# Patient Record
Sex: FEMALE | Race: WHITE | NOT HISPANIC OR LATINO | URBAN - METROPOLITAN AREA
[De-identification: names, ages, dates, MRNs, and addresses within clinical notes are randomized per-mention and may not be internally consistent; named-entity substitution may affect disease eponyms.]

---

## 2018-03-05 DIAGNOSIS — E03.9 ACQUIRED HYPOTHYROIDISM: Primary | ICD-10-CM

## 2018-03-05 RX ORDER — LEVOTHYROXINE SODIUM 100 UG/1
TABLET ORAL
COMMUNITY
Start: 2017-09-12 | End: 2018-03-05 | Stop reason: SDUPTHER

## 2018-03-05 RX ORDER — LEVOTHYROXINE SODIUM 100 UG/1
100 TABLET ORAL DAILY
Qty: 90 TABLET | Refills: 0 | Status: SHIPPED | OUTPATIENT
Start: 2018-03-05 | End: 2018-06-01 | Stop reason: SDUPTHER

## 2018-03-05 RX ORDER — ACETAMINOPHEN 500 MG
TABLET ORAL
COMMUNITY
Start: 2017-11-15

## 2018-03-05 RX ORDER — FAMOTIDINE 40 MG/1
40 TABLET, FILM COATED ORAL
COMMUNITY
Start: 2017-11-15 | End: 2018-12-06 | Stop reason: SDUPTHER

## 2018-03-05 RX ORDER — PROMETHAZINE HYDROCHLORIDE AND DEXTROMETHORPHAN HYDROBROMIDE 6.25; 15 MG/5ML; MG/5ML
SYRUP ORAL
COMMUNITY
Start: 2017-12-08 | End: 2018-06-06 | Stop reason: ALTCHOICE

## 2018-03-05 RX ORDER — FLUTICASONE PROPIONATE AND SALMETEROL XINAFOATE 115; 21 UG/1; UG/1
AEROSOL, METERED RESPIRATORY (INHALATION)
COMMUNITY
Start: 2017-01-27 | End: 2019-01-06 | Stop reason: SDUPTHER

## 2018-03-05 RX ORDER — CIPROFLOXACIN AND DEXAMETHASONE 3; 1 MG/ML; MG/ML
SUSPENSION/ DROPS AURICULAR (OTIC)
COMMUNITY
Start: 2017-11-21 | End: 2018-06-06 | Stop reason: ALTCHOICE

## 2018-06-01 ENCOUNTER — TELEPHONE (OUTPATIENT)
Dept: FAMILY MEDICINE | Facility: CLINIC | Age: 58
End: 2018-06-01

## 2018-06-01 DIAGNOSIS — E03.9 ACQUIRED HYPOTHYROIDISM: ICD-10-CM

## 2018-06-01 RX ORDER — LEVOTHYROXINE SODIUM 100 UG/1
100 TABLET ORAL DAILY
Qty: 90 TABLET | Refills: 0 | Status: SHIPPED | OUTPATIENT
Start: 2018-06-01 | End: 2018-08-29 | Stop reason: SDUPTHER

## 2018-06-01 RX ORDER — LEVOTHYROXINE SODIUM 100 UG/1
100 TABLET ORAL DAILY
Qty: 90 TABLET | Refills: 0 | Status: SHIPPED | OUTPATIENT
Start: 2018-06-01 | End: 2018-06-01 | Stop reason: SDUPTHER

## 2018-06-05 ENCOUNTER — TELEPHONE (OUTPATIENT)
Dept: FAMILY MEDICINE | Facility: CLINIC | Age: 58
End: 2018-06-05

## 2018-06-05 NOTE — TELEPHONE ENCOUNTER
Notified pt       ----- Message from Brigitte Andrea DO sent at 6/1/2018  3:13 PM EDT -----  Let pt know that the dexa scan showed normal bone density

## 2018-06-06 ENCOUNTER — OFFICE VISIT (OUTPATIENT)
Dept: FAMILY MEDICINE | Facility: CLINIC | Age: 58
End: 2018-06-06
Payer: COMMERCIAL

## 2018-06-06 VITALS
TEMPERATURE: 98 F | RESPIRATION RATE: 16 BRPM | SYSTOLIC BLOOD PRESSURE: 144 MMHG | BODY MASS INDEX: 43.98 KG/M2 | HEART RATE: 80 BPM | HEIGHT: 63 IN | WEIGHT: 248.2 LBS | DIASTOLIC BLOOD PRESSURE: 92 MMHG

## 2018-06-06 DIAGNOSIS — Z00.00 ROUTINE HISTORY AND PHYSICAL EXAMINATION OF ADULT: ICD-10-CM

## 2018-06-06 DIAGNOSIS — K63.5 POLYP OF COLON, UNSPECIFIED PART OF COLON, UNSPECIFIED TYPE: ICD-10-CM

## 2018-06-06 DIAGNOSIS — C43.9 MALIGNANT MELANOMA OF SKIN (CMS/HCC): ICD-10-CM

## 2018-06-06 DIAGNOSIS — Z11.59 NEED FOR HEPATITIS C SCREENING TEST: ICD-10-CM

## 2018-06-06 DIAGNOSIS — E03.9 ACQUIRED HYPOTHYROIDISM: ICD-10-CM

## 2018-06-06 DIAGNOSIS — Z00.00 ROUTINE GENERAL MEDICAL EXAMINATION AT A HEALTH CARE FACILITY: Primary | ICD-10-CM

## 2018-06-06 DIAGNOSIS — J45.21 MILD INTERMITTENT ASTHMA WITH ACUTE EXACERBATION: ICD-10-CM

## 2018-06-06 PROCEDURE — 99396 PREV VISIT EST AGE 40-64: CPT | Performed by: FAMILY MEDICINE

## 2018-06-06 NOTE — PATIENT INSTRUCTIONS
Sign for [facility]      [phone]

## 2018-06-06 NOTE — PROGRESS NOTES
Subjective      Patient ID: Edmundo Dumas is a 58 y.o. female.    58 year old here for a well check.  Had surgery on foot and then ended up needing to wear boot longer for a stress fracture.  Pt lives in [place].  She has  a new grandchild        Tobacco  Allergies  Meds  Problems  Med Hx  Surg Hx  Fam Hx       Review of Systems   Constitutional: Negative.    HENT: Negative.    Eyes: Negative.    Respiratory: Negative.    Cardiovascular: Negative.    Gastrointestinal: Negative.    Endocrine: Negative.    Musculoskeletal: Positive for joint swelling.   Neurological: Negative.    Hematological: Negative.    Psychiatric/Behavioral: Negative.      Allergies   Allergen Reactions   • Penicillins      Other reaction(s): Hives     Current Outpatient Prescriptions   Medication Sig Dispense Refill   • cholecalciferol, vitamin D3, (VITAMIN D3) 2,000 unit capsule No SIG Entered     • famotidine (PEPCID) 40 mg tablet 40 mg.     • fluticasone-salmeterol (ADVAIR HFA) 115-21 mcg/actuation inhaler inhale 2 puff by inhalation route 2 times every day in the morning and evening     • levothyroxine (SYNTHROID) 100 mcg tablet Take 1 tablet (100 mcg total) by mouth daily. 90 tablet 0     No current facility-administered medications for this visit.      Past Medical History:   Diagnosis Date   • Hx of phlebitis    • Hypothyroid 4/10/2014    Overview:    • Malignant melanoma of skin (CMS/HCC) (HCC) 4/10/2014    Overview:    • Mild intermittent asthma with acute exacerbation 4/10/2014    Overview:    • Reflux esophagitis 4/10/2014    Overview:      Past Surgical History:   Procedure Laterality Date   • POLYPECTOMY      from stomach   • THYROIDECTOMY       Social History   Substance Use Topics   • Smoking status: Never Smoker   • Smokeless tobacco: Never Used   • Alcohol use Not on file     Family History   Problem Relation Age of Onset   • Heart disease Mother    • Arrhythmia Mother    • Hypertension Mother        Objective  "  Vitals:    06/06/18 1004   BP: (!) 144/92   Pulse: 80   Resp: 16   Temp: 36.7 °C (98 °F)   Weight: 113 kg (248 lb 3.2 oz)   Height: 1.6 m (5' 3\")    Body mass index is 43.97 kg/m².  Physical Exam   Constitutional: She appears well-developed and well-nourished.   Eyes: Conjunctivae are normal. Pupils are equal, round, and reactive to light.   Neck: Normal range of motion.   Cardiovascular: Normal rate, regular rhythm and normal heart sounds.    Pulmonary/Chest: Effort normal and breath sounds normal.   Abdominal: Soft.   Musculoskeletal: Normal range of motion.   Neurological: She is alert.   Psychiatric: She has a normal mood and affect.       Assessment/Plan   Problem List Items Addressed This Visit     Mild intermittent asthma with acute exacerbation     Pt uses Advair regularly. Has prn albuteral prn         Colon polyp     Have old records sent to confirm dates of colonoscopy.    Last 2013          Hypothyroid     Lab Results   Component Value Date    TSH 0.816 10/17/2016     '  Needs new labs done         Malignant melanoma of skin (CMS/HCC) (HCC)     See derm regularly         Routine history and physical examination of adult     Check routine labs  Mammogram rx given  Have Colonoscopy report sent   Normal bone density         Relevant Orders    CBC with differential    TSH    Comprehensive metabolic panel    Lipid panel    Urinalysis with microscopic    BI SCREENING MAMMOGRAM BILATERAL    Need for hepatitis C screening test     Lab slip given         Relevant Orders    Hepatitis C antibody    CBC with differential    TSH    Comprehensive metabolic panel    Lipid panel    Urinalysis with microscopic    BI SCREENING MAMMOGRAM BILATERAL    BMI 40.0-44.9, adult (CMS/HCC) (HCC)     Pt with BMI  43.  Encouraged weight loss when can move again after healing of the foot.           Relevant Orders    Hemoglobin A1c      Other Visit Diagnoses     Routine general medical examination at a health care facility    -  " Primary    Relevant Orders    CBC with differential    TSH    Comprehensive metabolic panel    Lipid panel    Urinalysis with microscopic    BI SCREENING MAMMOGRAM BILATERAL    Urinalysis with microscopic          Patient agrees and verbalizes understanding of medication and treatment plan discussed at today's visit.  Patient was instructed to call or follow-up if symptoms persist or worsen.    No notes on file    Brigitte Andrea DO    This note was created with voice recognition software. Inadvertent dictation errors should be disregarded. Please contact my office for clarification.

## 2018-06-16 PROBLEM — Z00.00 ROUTINE HISTORY AND PHYSICAL EXAMINATION OF ADULT: Status: ACTIVE | Noted: 2018-06-16

## 2018-06-16 PROBLEM — E66.813 OBESITY, CLASS III, BMI 40-49.9 (MORBID OBESITY): Status: ACTIVE | Noted: 2018-06-16

## 2018-06-16 PROBLEM — E66.01 OBESITY, CLASS III, BMI 40-49.9 (MORBID OBESITY) (CMS/HCC): Status: ACTIVE | Noted: 2018-06-16

## 2018-06-16 PROBLEM — Z11.59 NEED FOR HEPATITIS C SCREENING TEST: Status: ACTIVE | Noted: 2018-06-16

## 2018-06-16 ASSESSMENT — ENCOUNTER SYMPTOMS
CONSTITUTIONAL NEGATIVE: 1
JOINT SWELLING: 1
HEMATOLOGIC/LYMPHATIC NEGATIVE: 1
PSYCHIATRIC NEGATIVE: 1
NEUROLOGICAL NEGATIVE: 1
ENDOCRINE NEGATIVE: 1
EYES NEGATIVE: 1
RESPIRATORY NEGATIVE: 1
GASTROINTESTINAL NEGATIVE: 1
CARDIOVASCULAR NEGATIVE: 1

## 2018-08-29 DIAGNOSIS — E03.9 ACQUIRED HYPOTHYROIDISM: ICD-10-CM

## 2018-08-29 RX ORDER — LEVOTHYROXINE SODIUM 100 UG/1
100 TABLET ORAL DAILY
Qty: 90 TABLET | Refills: 0 | Status: SHIPPED | OUTPATIENT
Start: 2018-08-29 | End: 2018-11-26 | Stop reason: SDUPTHER

## 2018-08-29 NOTE — TELEPHONE ENCOUNTER
Lab Results   Component Value Date    TSH 0.816 10/17/2016     Please call pt and let her know I am going to fill her thryoid med but do not have a copy of her recent labs so want to make sure if she did them that I get a copy and if she has't gotten there yet that she has them done.  They were written on 6/6/18

## 2018-09-15 LAB
ALBUMIN SERPL-MCNC: 3.9 G/DL (ref 3.6–5.1)
ALBUMIN/GLOB SERPL: 1.5 (CALC) (ref 1–2.5)
ALP SERPL-CCNC: 54 U/L (ref 33–130)
ALT SERPL-CCNC: 11 U/L (ref 6–29)
APPEARANCE UR: CLEAR
AST SERPL-CCNC: 10 U/L (ref 10–35)
BACTERIA #/AREA URNS HPF: (no result) /HPF
BASOPHILS # BLD AUTO: 21 CELLS/UL (ref 0–200)
BASOPHILS NFR BLD AUTO: 0.4 %
BILIRUB SERPL-MCNC: 0.4 MG/DL (ref 0.2–1.2)
BILIRUB UR QL STRIP: NEGATIVE
BUN SERPL-MCNC: 17 MG/DL (ref 7–25)
BUN/CREAT SERPL: NORMAL (CALC) (ref 6–22)
CALCIUM SERPL-MCNC: 9 MG/DL (ref 8.6–10.4)
CHLORIDE SERPL-SCNC: 105 MMOL/L (ref 98–110)
CHOLEST SERPL-MCNC: 182 MG/DL
CHOLEST/HDLC SERPL: 2.8 (CALC)
CO2 SERPL-SCNC: 30 MMOL/L (ref 20–32)
COLOR UR: YELLOW
CREAT SERPL-MCNC: 0.78 MG/DL (ref 0.5–0.99)
EOSINOPHIL # BLD AUTO: 161 CELLS/UL (ref 15–500)
EOSINOPHIL NFR BLD AUTO: 3.1 %
ERYTHROCYTE [DISTWIDTH] IN BLOOD BY AUTOMATED COUNT: 12.3 % (ref 11–15)
GFR SERPL CREATININE-BSD FRML MDRD: 83 ML/MIN/1.73M2
GLOBULIN SER CALC-MCNC: 2.6 G/DL (CALC) (ref 1.9–3.7)
GLUCOSE SERPL-MCNC: 95 MG/DL (ref 65–99)
GLUCOSE UR QL STRIP: NEGATIVE
HBA1C MFR BLD: 5.5 % OF TOTAL HGB
HCT VFR BLD AUTO: 38.8 % (ref 35–45)
HCV AB S/CO SERPL IA: 0.01
HCV AB SERPL QL IA: NORMAL
HDLC SERPL-MCNC: 64 MG/DL
HGB BLD-MCNC: 13.1 G/DL (ref 11.7–15.5)
HGB UR QL STRIP: NEGATIVE
HYALINE CASTS #/AREA URNS LPF: (no result) /LPF
KETONES UR QL STRIP: NEGATIVE
LDLC SERPL CALC-MCNC: 103 MG/DL (CALC)
LEUKOCYTE ESTERASE UR QL STRIP: NEGATIVE
LYMPHOCYTES # BLD AUTO: 1706 CELLS/UL (ref 850–3900)
LYMPHOCYTES NFR BLD AUTO: 32.8 %
MCH RBC QN AUTO: 30.7 PG (ref 27–33)
MCHC RBC AUTO-ENTMCNC: 33.8 G/DL (ref 32–36)
MCV RBC AUTO: 90.9 FL (ref 80–100)
MONOCYTES # BLD AUTO: 354 CELLS/UL (ref 200–950)
MONOCYTES NFR BLD AUTO: 6.8 %
NEUTROPHILS # BLD AUTO: 2959 CELLS/UL (ref 1500–7800)
NEUTROPHILS NFR BLD AUTO: 56.9 %
NITRITE UR QL STRIP: NEGATIVE
NONHDLC SERPL-MCNC: 118 MG/DL (CALC)
PH UR STRIP: 7.5 [PH] (ref 5–8)
PLATELET # BLD AUTO: 219 THOUSAND/UL (ref 140–400)
PMV BLD REES-ECKER: 12.3 FL (ref 7.5–12.5)
POTASSIUM SERPL-SCNC: 4.3 MMOL/L (ref 3.5–5.3)
PROT SERPL-MCNC: 6.5 G/DL (ref 6.1–8.1)
PROT UR QL STRIP: NEGATIVE
RBC # BLD AUTO: 4.27 MILLION/UL (ref 3.8–5.1)
RBC #/AREA URNS HPF: (no result) /HPF
SODIUM SERPL-SCNC: 140 MMOL/L (ref 135–146)
SP GR UR STRIP: 1.02 (ref 1–1.03)
SQUAMOUS #/AREA URNS HPF: (no result) /HPF
TRIGL SERPL-MCNC: 63 MG/DL
TSH SERPL-ACNC: 0.96 MIU/L (ref 0.4–4.5)
WBC # BLD AUTO: 5.2 THOUSAND/UL (ref 3.8–10.8)
WBC #/AREA URNS HPF: (no result) /HPF

## 2018-09-19 NOTE — PROGRESS NOTES
Let patient know her CBC, metabolic panel, thyroid, urinalysis were all normal.  Her hemoglobin A1c was in the normal range as well.  Her hepatitis C antibody test was negative.    her cholesterol was good at 182.  Her HDL is good at 64, and her LDL was good at 103

## 2018-09-20 ENCOUNTER — TELEPHONE (OUTPATIENT)
Dept: FAMILY MEDICINE | Facility: CLINIC | Age: 58
End: 2018-09-20

## 2018-09-20 NOTE — TELEPHONE ENCOUNTER
Notified pt    ----- Message from Brigitte Andrea DO sent at 9/18/2018 10:39 PM EDT -----  Let patient know her CBC, metabolic panel, thyroid, urinalysis were all normal.  Her hemoglobin A1c was in the normal range as well.  Her hepatitis C antibody test was negative.    her cholesterol was good at 182.  Her HDL is good at 64, and her LDL was good at 103

## 2018-11-01 ENCOUNTER — TELEPHONE (OUTPATIENT)
Dept: FAMILY MEDICINE | Facility: CLINIC | Age: 58
End: 2018-11-01

## 2018-11-02 RX ORDER — AZITHROMYCIN 250 MG/1
TABLET, FILM COATED ORAL
Qty: 6 TABLET | Refills: 0 | Status: SHIPPED | OUTPATIENT
Start: 2018-11-02 | End: 2019-03-25 | Stop reason: ALTCHOICE

## 2018-11-02 RX ORDER — ALBUTEROL SULFATE 90 UG/1
2 INHALANT RESPIRATORY (INHALATION) EVERY 6 HOURS PRN
Qty: 1 INHALER | Refills: 1 | Status: SHIPPED | OUTPATIENT
Start: 2018-11-02 | End: 2019-01-28 | Stop reason: SDUPTHER

## 2018-11-02 RX ORDER — ALBUTEROL SULFATE 0.83 MG/ML
2.5 SOLUTION RESPIRATORY (INHALATION) EVERY 6 HOURS PRN
Qty: 1 PACKAGE | Refills: 1 | Status: SHIPPED | OUTPATIENT
Start: 2018-11-02 | End: 2019-06-11 | Stop reason: SDUPTHER

## 2018-11-02 NOTE — TELEPHONE ENCOUNTER
Pt aware z pack and albuteral inhaler  as needed  called in. patient also aware that albuterol nebulizers were sent because she does have a nebulizer that she can use as needed.  Patient often will get wheezing and asthmatic flare with upper respiratory infections.  She is aware with fever or significant shortness of breath that she should be evaluated closer to home since she lives in [facility] but continues to come here.

## 2018-11-06 ENCOUNTER — TELEPHONE (OUTPATIENT)
Dept: FAMILY MEDICINE | Facility: CLINIC | Age: 58
End: 2018-11-06

## 2018-11-06 NOTE — TELEPHONE ENCOUNTER
Pt lm - update starting to feel better, just finished zpak. Still has some wheezing is continuing to use inhaler

## 2018-11-08 ENCOUNTER — TELEPHONE (OUTPATIENT)
Dept: FAMILY MEDICINE | Facility: CLINIC | Age: 58
End: 2018-11-08

## 2018-11-08 NOTE — TELEPHONE ENCOUNTER
Lm for patient     ----- Message from Brigitte Andrea DO sent at 11/8/2018 11:42 AM EST -----  Let pt know that the mammogram was stable.  No sign of malignancy.  Re ck in 1 year

## 2018-11-09 ENCOUNTER — TELEPHONE (OUTPATIENT)
Dept: FAMILY MEDICINE | Facility: CLINIC | Age: 58
End: 2018-11-09

## 2018-11-26 DIAGNOSIS — E03.9 ACQUIRED HYPOTHYROIDISM: ICD-10-CM

## 2018-11-26 RX ORDER — LEVOTHYROXINE SODIUM 100 UG/1
TABLET ORAL
Qty: 90 TABLET | Refills: 0 | Status: SHIPPED | OUTPATIENT
Start: 2018-11-26 | End: 2019-05-18 | Stop reason: SDUPTHER

## 2018-11-30 DIAGNOSIS — E03.9 ACQUIRED HYPOTHYROIDISM: ICD-10-CM

## 2018-11-30 RX ORDER — LEVOTHYROXINE SODIUM 100 UG/1
TABLET ORAL
Qty: 90 TABLET | Refills: 0 | Status: SHIPPED | OUTPATIENT
Start: 2018-11-30 | End: 2019-03-25 | Stop reason: ALTCHOICE

## 2018-12-06 RX ORDER — FAMOTIDINE 40 MG/1
TABLET, FILM COATED ORAL
Qty: 180 TABLET | Refills: 4 | Status: SHIPPED | OUTPATIENT
Start: 2018-12-06 | End: 2019-12-05 | Stop reason: SDUPTHER

## 2019-01-07 RX ORDER — FLUTICASONE PROPIONATE AND SALMETEROL XINAFOATE 115; 21 UG/1; UG/1
AEROSOL, METERED RESPIRATORY (INHALATION)
Qty: 12 G | Refills: 1 | Status: SHIPPED | OUTPATIENT
Start: 2019-01-07 | End: 2019-03-25 | Stop reason: ALTCHOICE

## 2019-01-28 RX ORDER — ALBUTEROL SULFATE 90 UG/1
AEROSOL, METERED RESPIRATORY (INHALATION)
Qty: 18 INHALER | Refills: 1 | Status: SHIPPED | OUTPATIENT
Start: 2019-01-28 | End: 2021-07-14 | Stop reason: SDUPTHER

## 2019-03-25 ENCOUNTER — OFFICE VISIT (OUTPATIENT)
Dept: FAMILY MEDICINE | Facility: CLINIC | Age: 59
End: 2019-03-25
Payer: COMMERCIAL

## 2019-03-25 VITALS
TEMPERATURE: 97.9 F | DIASTOLIC BLOOD PRESSURE: 70 MMHG | BODY MASS INDEX: 43.94 KG/M2 | WEIGHT: 248 LBS | RESPIRATION RATE: 18 BRPM | HEIGHT: 63 IN | SYSTOLIC BLOOD PRESSURE: 108 MMHG | HEART RATE: 100 BPM

## 2019-03-25 DIAGNOSIS — R07.89 CHEST DISCOMFORT: Primary | ICD-10-CM

## 2019-03-25 DIAGNOSIS — C43.9 MALIGNANT MELANOMA OF SKIN (CMS/HCC): ICD-10-CM

## 2019-03-25 DIAGNOSIS — K21.00 REFLUX ESOPHAGITIS: ICD-10-CM

## 2019-03-25 DIAGNOSIS — E03.9 ACQUIRED HYPOTHYROIDISM: ICD-10-CM

## 2019-03-25 DIAGNOSIS — J45.21 MILD INTERMITTENT ASTHMA WITH ACUTE EXACERBATION: ICD-10-CM

## 2019-03-25 DIAGNOSIS — E66.01 OBESITY, CLASS III, BMI 40-49.9 (MORBID OBESITY) (CMS/HCC): ICD-10-CM

## 2019-03-25 DIAGNOSIS — R53.83 FATIGUE, UNSPECIFIED TYPE: ICD-10-CM

## 2019-03-25 DIAGNOSIS — J45.909 UNCOMPLICATED ASTHMA, UNSPECIFIED ASTHMA SEVERITY, UNSPECIFIED WHETHER PERSISTENT: ICD-10-CM

## 2019-03-25 PROBLEM — M19.019 OSTEOARTHRITIS OF ACROMIOCLAVICULAR JOINT: Status: ACTIVE | Noted: 2019-03-25

## 2019-03-25 PROBLEM — M19.90 ARTHRITIS: Status: ACTIVE | Noted: 2019-03-25

## 2019-03-25 PROBLEM — F41.9 ANXIETY: Status: ACTIVE | Noted: 2019-03-25

## 2019-03-25 PROBLEM — R12 HEARTBURN: Status: ACTIVE | Noted: 2019-03-25

## 2019-03-25 PROBLEM — R10.13 EPIGASTRIC PAIN: Status: ACTIVE | Noted: 2019-03-25

## 2019-03-25 PROCEDURE — 93000 ELECTROCARDIOGRAM COMPLETE: CPT | Performed by: FAMILY MEDICINE

## 2019-03-25 PROCEDURE — 99214 OFFICE O/P EST MOD 30 MIN: CPT | Mod: 25 | Performed by: FAMILY MEDICINE

## 2019-03-25 PROCEDURE — 36415 COLL VENOUS BLD VENIPUNCTURE: CPT | Performed by: FAMILY MEDICINE

## 2019-03-25 ASSESSMENT — ENCOUNTER SYMPTOMS
RESPIRATORY NEGATIVE: 1
CONSTITUTIONAL NEGATIVE: 1
PALPITATIONS: 1

## 2019-03-25 NOTE — PROGRESS NOTES
Subjective      Patient ID: Edmundo Dumas is a 59 y.o. female.    HPI    Problems       Review of Systems  Allergies   Allergen Reactions   • Contrast  [Iodinated Contrast- Oral And Iv Dye] Nausea And Vomiting and Dermatitis   • Moxifloxacin Nausea And Vomiting and Dermatitis   • Penicillins      Other reaction(s): Hives   • Sulfa (Sulfonamide Antibiotics) Nausea And Vomiting     Current Outpatient Prescriptions   Medication Sig Dispense Refill   • albuterol 2.5 mg /3 mL (0.083 %) nebulizer solution Take 3 mL (2.5 mg total) by nebulization every 6 (six) hours as needed for wheezing. 1 Package 1   • cholecalciferol, vitamin D3, (VITAMIN D3) 2,000 unit capsule No SIG Entered     • famotidine (PEPCID) 40 mg tablet TAKE ONE TABLET BY MOUTH TWICE DAILY 180 tablet 4   • SYNTHROID 100 mcg tablet TAKE 1 TABLET BY MOUTH EVERY DAY 90 tablet 0   • VENTOLIN HFA 90 mcg/actuation inhaler TAKE 2 PUFFS BY MOUTH EVERY 6 HOURS AS NEEDED FOR WHEEZE 18 Inhaler 1   • ADVAIR -21 mcg/actuation inhaler INHALE 2 PUFF BY INHALATION ROUTE 2 TIMES EVERY DAY IN THE MORNING AND EVENING (Patient not taking: Reported on 3/25/2019) 12 g 1     No current facility-administered medications for this visit.      Past Medical History:   Diagnosis Date   • Hx of phlebitis    • Hypothyroid 4/10/2014    Overview:    • Malignant melanoma of skin (CMS/HCC) (HCC) 4/10/2014    Overview:    • Mild intermittent asthma with acute exacerbation 4/10/2014    Overview:    • Reflux esophagitis 4/10/2014    Overview:      Past Surgical History:   Procedure Laterality Date   • POLYPECTOMY      from stomach   • THYROIDECTOMY       Social History   Substance Use Topics   • Smoking status: Never Smoker   • Smokeless tobacco: Never Used   • Alcohol use Not on file     Family History   Problem Relation Age of Onset   • Heart disease Mother    • Arrhythmia Mother    • Hypertension Mother        Objective   Vitals:    03/25/19 1144   BP: 108/70   Pulse: 100   Resp:  "18   Temp: 36.6 °C (97.9 °F)   Weight: 112 kg (248 lb)   Height: 1.6 m (5' 3\")    Body mass index is 43.93 kg/m².  Physical Exam    Assessment/Plan   Problem List Items Addressed This Visit     Mild intermittent asthma with acute exacerbation     Her pharmacy needs to have info for her advair         Hypothyroid     Lab Results   Component Value Date    TSH 0.96 09/14/2018     Check TSH today          Malignant melanoma of skin (CMS/[facility]) ([facility])     Sees derm regularly         Reflux esophagitis     Famotidine          Obesity, Class III, BMI 40-49.9 (morbid obesity) (CMS/[facility]) ([facility])    Chest discomfort - Primary     EKG NSR  Atypical in nature  Seconds in left chest and then none    3 x over last          Relevant Orders    ECG 12 LEAD OFFICE PERFORMED    CBC and Differential    Comprehensive metabolic panel    TSH 3rd Generation    Fatigue     Check labs  EKG ok          Relevant Orders    ECG 12 LEAD OFFICE PERFORMED    CBC and Differential    Comprehensive metabolic panel    TSH 3rd Generation          Patient agrees and verbalizes understanding of medication and treatment plan discussed at today's visit.  Patient was instructed to call or follow-up if symptoms persist or worsen.    No notes on file    Brigitte Andrea,       This note was created with voice recognition software. Inadvertent dictation errors should be disregarded. Please contact my office for clarification.  "

## 2019-03-25 NOTE — ASSESSMENT & PLAN NOTE
Famotidine if flares will use omeprazole OTC strength  With her atypical pain that is worse with laying flat will start omeprazole 40 mg daily and re ck in 8 weeks

## 2019-03-25 NOTE — PROGRESS NOTES
Subjective      Patient ID: Edmundo Dumas is a 59 y.o. female.    Pt started with  sharp chest pains over the last 3 weeks.   She had a sharp pain in the left mid chest.   It lasts minute.   Deep breath would make worse then would resolve. Worse when lays flat.  Some heartburn sx.          Allergies  Meds  Problems  Med Hx  Surg Hx  Fam Hx       Review of Systems   Constitutional: Negative.    HENT: Negative.    Respiratory: Negative.    Cardiovascular: Positive for chest pain and palpitations.     Allergies   Allergen Reactions   • Contrast  [Iodinated Contrast- Oral And Iv Dye] Nausea And Vomiting and Dermatitis   • Moxifloxacin Nausea And Vomiting and Dermatitis   • Penicillins      Other reaction(s): Hives   • Sulfa (Sulfonamide Antibiotics) Nausea And Vomiting     Current Outpatient Prescriptions   Medication Sig Dispense Refill   • albuterol 2.5 mg /3 mL (0.083 %) nebulizer solution Take 3 mL (2.5 mg total) by nebulization every 6 (six) hours as needed for wheezing. 1 Package 1   • cholecalciferol, vitamin D3, (VITAMIN D3) 2,000 unit capsule No SIG Entered     • famotidine (PEPCID) 40 mg tablet TAKE ONE TABLET BY MOUTH TWICE DAILY 180 tablet 4   • SYNTHROID 100 mcg tablet TAKE 1 TABLET BY MOUTH EVERY DAY 90 tablet 0   • VENTOLIN HFA 90 mcg/actuation inhaler TAKE 2 PUFFS BY MOUTH EVERY 6 HOURS AS NEEDED FOR WHEEZE 18 Inhaler 1   • mometasone-formoterol (DULERA) 200-5 mcg/actuation inhaler Inhale 2 puffs 2 (two) times a day. Rinse mouth with water after use to reduce aftertaste and incidence of candidiasis. Do not swallow. 13 g 3     No current facility-administered medications for this visit.      Past Medical History:   Diagnosis Date   • Hx of phlebitis    • Hypothyroid 4/10/2014    Overview:    • Malignant melanoma of skin (CMS/HCC) ([facility]) 4/10/2014    Overview:    • Mild intermittent asthma with acute exacerbation 4/10/2014    Overview:    • Reflux esophagitis 4/10/2014    Overview:      Past  "Surgical History:   Procedure Laterality Date   • POLYPECTOMY      from stomach   • THYROIDECTOMY       Social History   Substance Use Topics   • Smoking status: Never Smoker   • Smokeless tobacco: Never Used   • Alcohol use Not on file     Family History   Problem Relation Age of Onset   • Heart disease Mother    • Arrhythmia Mother    • Hypertension Mother        Objective   Vitals:    03/25/19 1144   BP: 108/70   Pulse: 100   Resp: 18   Temp: 36.6 °C (97.9 °F)   Weight: 112 kg (248 lb)   Height: 1.6 m (5' 3\")    Body mass index is 43.93 kg/m².  Physical Exam   Constitutional: She appears well-developed and well-nourished.   Cardiovascular: Normal rate and regular rhythm.        Assessment/Plan   Problem List Items Addressed This Visit     Mild intermittent asthma with acute exacerbation     Her pharmacy needs to have info for her advair         Relevant Medications    mometasone-formoterol (DULERA) 200-5 mcg/actuation inhaler    Hypothyroid     Lab Results   Component Value Date    TSH 0.96 09/14/2018     Check TSH today          Malignant melanoma of skin (CMS/HCC) ([facility])     Sees derm regularly         Reflux esophagitis     Famotidine if flares will use omeprazole OTC strength  With her atypical pain that is worse with laying flat will start omeprazole 40 mg daily and re ck in 8 weeks             Obesity, Class III, BMI 40-49.9 (morbid obesity) (CMS/HCC) ([facility])    BMI 40.0-44.9, adult (CMS/HCC) ([facility])     BMI  43         Chest discomfort - Primary     EKG NSR  Atypical in nature  Seconds in left chest and then none    3 x over last few weeks  Start omeprazole  40 mg( some worse pain laying flat in bed)   Exercise stress test given          Relevant Orders    ECG 12 LEAD OFFICE PERFORMED (Completed)    CBC and Differential (Completed)    Comprehensive metabolic panel (Completed)    TSH 3rd Generation (Completed)    Exercise stress test    Fatigue     Check labs as  Ordered   EKG ok          Relevant Orders    ECG " 12 LEAD OFFICE PERFORMED (Completed)    CBC and Differential (Completed)    Comprehensive metabolic panel (Completed)    TSH 3rd Generation (Completed)    Exercise stress test    Asthma     Pt was changed to Dulera due to cost ,  If pt does not do well with this we will try to get advari approved.  She can not tolerate Adviar HFA so do not think she will be able to do the other option on her formulary which is Breo          Relevant Medications    mometasone-formoterol (DULERA) 200-5 mcg/actuation inhaler          Patient agrees and verbalizes understanding of medication and treatment plan discussed at today's visit.  Patient was instructed to call or follow-up if symptoms persist or worsen.    No notes on file    Brigitte Andrea,       This note was created with voice recognition software. Inadvertent dictation errors should be disregarded. Please contact my office for clarification.

## 2019-03-25 NOTE — ASSESSMENT & PLAN NOTE
EKG NSR  Atypical in nature  Seconds in left chest and then none    3 x over last few weeks  Start omeprazole  40 mg( some worse pain laying flat in bed)   Exercise stress test given

## 2019-03-26 LAB
ALBUMIN SERPL-MCNC: 4 G/DL (ref 3.6–4.8)
ALBUMIN/GLOB SERPL: 1.5 {RATIO} (ref 1.2–2.2)
ALP SERPL-CCNC: 58 IU/L (ref 39–117)
ALT SERPL-CCNC: 13 IU/L (ref 0–32)
AST SERPL-CCNC: 14 IU/L (ref 0–40)
BASOPHILS # BLD AUTO: 0 X10E3/UL (ref 0–0.2)
BASOPHILS NFR BLD AUTO: 1 %
BILIRUB SERPL-MCNC: 0.4 MG/DL (ref 0–1.2)
BUN SERPL-MCNC: 17 MG/DL (ref 8–27)
BUN/CREAT SERPL: 25 (ref 12–28)
CALCIUM SERPL-MCNC: 9.5 MG/DL (ref 8.7–10.3)
CHLORIDE SERPL-SCNC: 103 MMOL/L (ref 96–106)
CO2 SERPL-SCNC: 23 MMOL/L (ref 20–29)
CREAT SERPL-MCNC: 0.69 MG/DL (ref 0.57–1)
EOSINOPHIL # BLD AUTO: 0.1 X10E3/UL (ref 0–0.4)
EOSINOPHIL NFR BLD AUTO: 1 %
ERYTHROCYTE [DISTWIDTH] IN BLOOD BY AUTOMATED COUNT: 13.6 % (ref 12.3–15.4)
GLOBULIN SER CALC-MCNC: 2.7 G/DL (ref 1.5–4.5)
GLUCOSE SERPL-MCNC: 120 MG/DL (ref 65–99)
HCT VFR BLD AUTO: 41.3 % (ref 34–46.6)
HGB BLD-MCNC: 13.6 G/DL (ref 11.1–15.9)
IMM GRANULOCYTES # BLD AUTO: 0 X10E3/UL (ref 0–0.1)
IMM GRANULOCYTES NFR BLD AUTO: 0 %
LAB CORP EGFR IF AFRICN AM: 109 ML/MIN/1.73
LAB CORP EGFR IF NONAFRICN AM: 94 ML/MIN/1.73
LYMPHOCYTES # BLD AUTO: 2.5 X10E3/UL (ref 0.7–3.1)
LYMPHOCYTES NFR BLD AUTO: 29 %
MCH RBC QN AUTO: 29.6 PG (ref 26.6–33)
MCHC RBC AUTO-ENTMCNC: 32.9 G/DL (ref 31.5–35.7)
MCV RBC AUTO: 90 FL (ref 79–97)
MONOCYTES # BLD AUTO: 0.4 X10E3/UL (ref 0.1–0.9)
MONOCYTES NFR BLD AUTO: 5 %
NEUTROPHILS # BLD AUTO: 5.4 X10E3/UL (ref 1.4–7)
NEUTROPHILS NFR BLD AUTO: 64 %
PLATELET # BLD AUTO: 253 X10E3/UL (ref 150–379)
POTASSIUM SERPL-SCNC: 4.2 MMOL/L (ref 3.5–5.2)
PROT SERPL-MCNC: 6.7 G/DL (ref 6–8.5)
RBC # BLD AUTO: 4.6 X10E6/UL (ref 3.77–5.28)
SODIUM SERPL-SCNC: 143 MMOL/L (ref 134–144)
TSH SERPL DL<=0.005 MIU/L-ACNC: 0.91 UIU/ML (ref 0.45–4.5)
WBC # BLD AUTO: 8.5 X10E3/UL (ref 3.4–10.8)

## 2019-03-27 ENCOUNTER — TELEPHONE (OUTPATIENT)
Dept: FAMILY MEDICINE | Facility: CLINIC | Age: 59
End: 2019-03-27

## 2019-03-27 NOTE — TELEPHONE ENCOUNTER
----- Message from Brigitte Andrea DO sent at 3/26/2019  8:02 PM EDT -----  Let pt know that her labs were all ok

## 2019-03-27 NOTE — ASSESSMENT & PLAN NOTE
Pt was changed to Dulera due to cost ,  If pt does not do well with this we will try to get advari approved.  She can not tolerate Adviar HFA so do not think she will be able to do the other option on her formulary which is Breo

## 2019-03-28 ENCOUNTER — TELEPHONE (OUTPATIENT)
Dept: FAMILY MEDICINE | Facility: CLINIC | Age: 59
End: 2019-03-28

## 2019-03-28 NOTE — TELEPHONE ENCOUNTER
REY for patient    ----- Message from Brigitte Andrea DO sent at 3/26/2019  8:02 PM EDT -----  Let pt know that her labs were all ok

## 2019-05-17 DIAGNOSIS — E03.9 ACQUIRED HYPOTHYROIDISM: ICD-10-CM

## 2019-05-18 RX ORDER — LEVOTHYROXINE SODIUM 100 UG/1
TABLET ORAL
Qty: 90 TABLET | Refills: 0 | Status: SHIPPED | OUTPATIENT
Start: 2019-05-18 | End: 2019-08-15 | Stop reason: SDUPTHER

## 2019-06-11 ENCOUNTER — OFFICE VISIT (OUTPATIENT)
Dept: FAMILY MEDICINE | Facility: CLINIC | Age: 59
End: 2019-06-11
Payer: COMMERCIAL

## 2019-06-11 VITALS
BODY MASS INDEX: 44.83 KG/M2 | RESPIRATION RATE: 16 BRPM | HEART RATE: 73 BPM | TEMPERATURE: 98.5 F | HEIGHT: 63 IN | DIASTOLIC BLOOD PRESSURE: 80 MMHG | WEIGHT: 253 LBS | SYSTOLIC BLOOD PRESSURE: 116 MMHG

## 2019-06-11 DIAGNOSIS — J45.21 MILD INTERMITTENT ASTHMA WITH ACUTE EXACERBATION: ICD-10-CM

## 2019-06-11 DIAGNOSIS — K63.5 POLYP OF COLON, UNSPECIFIED PART OF COLON, UNSPECIFIED TYPE: ICD-10-CM

## 2019-06-11 DIAGNOSIS — R07.89 CHEST DISCOMFORT: ICD-10-CM

## 2019-06-11 DIAGNOSIS — Z12.31 SCREENING MAMMOGRAM, ENCOUNTER FOR: ICD-10-CM

## 2019-06-11 DIAGNOSIS — Z11.59 NEED FOR HEPATITIS C SCREENING TEST: ICD-10-CM

## 2019-06-11 DIAGNOSIS — M19.90 ARTHRITIS: ICD-10-CM

## 2019-06-11 DIAGNOSIS — Z00.00 ROUTINE HISTORY AND PHYSICAL EXAMINATION OF ADULT: Primary | ICD-10-CM

## 2019-06-11 DIAGNOSIS — J45.909 UNCOMPLICATED ASTHMA, UNSPECIFIED ASTHMA SEVERITY, UNSPECIFIED WHETHER PERSISTENT: ICD-10-CM

## 2019-06-11 DIAGNOSIS — F41.9 ANXIETY: ICD-10-CM

## 2019-06-11 DIAGNOSIS — E03.9 ACQUIRED HYPOTHYROIDISM: ICD-10-CM

## 2019-06-11 DIAGNOSIS — C43.9 MALIGNANT MELANOMA OF SKIN (CMS/HCC): ICD-10-CM

## 2019-06-11 PROCEDURE — 36415 COLL VENOUS BLD VENIPUNCTURE: CPT | Performed by: FAMILY MEDICINE

## 2019-06-11 PROCEDURE — 99396 PREV VISIT EST AGE 40-64: CPT | Mod: 25 | Performed by: FAMILY MEDICINE

## 2019-06-11 RX ORDER — ALBUTEROL SULFATE 0.83 MG/ML
2.5 SOLUTION RESPIRATORY (INHALATION) EVERY 6 HOURS PRN
Qty: 1 PACKAGE | Refills: 1 | Status: SHIPPED | OUTPATIENT
Start: 2019-06-11 | End: 2021-07-09 | Stop reason: SDUPTHER

## 2019-06-11 RX ORDER — OMEPRAZOLE 20 MG/1
20 CAPSULE, DELAYED RELEASE ORAL
COMMUNITY
End: 2021-01-22 | Stop reason: DRUGHIGH

## 2019-06-11 ASSESSMENT — ENCOUNTER SYMPTOMS
CARDIOVASCULAR NEGATIVE: 1
CONSTITUTIONAL NEGATIVE: 1

## 2019-06-11 NOTE — PATIENT INSTRUCTIONS
Problem List Items Addressed This Visit     Mild intermittent asthma with acute exacerbation    Relevant Medications    albuterol 2.5 mg /3 mL (0.083 %) nebulizer solution    Colon polyp     Last colon 8/2017          Routine history and physical examination of adult - Primary    Relevant Orders    CBC and Differential    Comprehensive metabolic panel    Hemoglobin A1c    Lipid panel    TSH 3rd Generation    Urinalysis with microscopic    Vitamin B12    Vitamin D 25 hydroxy    BMI 40.0-44.9, adult (CMS/HCC) ([facility])    Chest discomfort     Stress test done  Did not get result  Getting faxed today          Anxiety     Stable. No meds         Relevant Orders    TSH 3rd Generation    Vitamin B12    Vitamin D 25 hydroxy    Arthritis     OA , prn          Asthma     On maintenance Dulera.   200/5   1 inh 2 x day /  With 2 puffs BID will get a HA./            Relevant Medications    albuterol 2.5 mg /3 mL (0.083 %) nebulizer solution

## 2019-06-11 NOTE — PROGRESS NOTES
Subjective      Patient ID: Edmundo Dumas is a 59 y.o. female.    Pt is here for her physical.   She feels ok.  She did have a stress test that was negative         Tobacco  Allergies  Meds  Problems  Med Hx  Surg Hx  Fam Hx       Review of Systems   Constitutional: Negative.         Obesity   HENT: Positive for congestion (nasal).    Eyes: Negative.    Respiratory: Positive for cough (just starting /allergic).    Cardiovascular: Negative.    Gastrointestinal: Negative.    Musculoskeletal: Positive for back pain.   Skin: Negative.    Allergic/Immunologic: Positive for environmental allergies.   Neurological: Negative.    Hematological: Negative.    Psychiatric/Behavioral: Negative.      Allergies   Allergen Reactions   • Contrast  [Iodinated Contrast- Oral And Iv Dye] Nausea And Vomiting and Dermatitis   • Moxifloxacin Nausea And Vomiting and Dermatitis   • Penicillins      Other reaction(s): Hives   • Sulfa (Sulfonamide Antibiotics) Nausea And Vomiting     Current Outpatient Prescriptions   Medication Sig Dispense Refill   • albuterol 2.5 mg /3 mL (0.083 %) nebulizer solution Take 3 mL (2.5 mg total) by nebulization every 6 (six) hours as needed for wheezing. 1 Package 1   • cholecalciferol, vitamin D3, (VITAMIN D3) 2,000 unit capsule No SIG Entered     • famotidine (PEPCID) 40 mg tablet TAKE ONE TABLET BY MOUTH TWICE DAILY 180 tablet 4   • omeprazole (PriLOSEC) 20 mg capsule Take 20 mg by mouth daily before breakfast.     • SYNTHROID 100 mcg tablet TAKE 1 TABLET BY MOUTH EVERY DAY 90 tablet 0   • VENTOLIN HFA 90 mcg/actuation inhaler TAKE 2 PUFFS BY MOUTH EVERY 6 HOURS AS NEEDED FOR WHEEZE 18 Inhaler 1   • mometasone-formoterol (DULERA) 200-5 mcg/actuation inhaler Inhale 2 puffs 2 (two) times a day. Rinse mouth with water after use to reduce aftertaste and incidence of candidiasis. Do not swallow. 13 g 3     No current facility-administered medications for this visit.      Past Medical History:  "  Diagnosis Date   • Hx of phlebitis    • Hypothyroid 4/10/2014    Overview:    • Malignant melanoma of skin (CMS/HCC) ([facility]) 4/10/2014    Overview:    • Mild intermittent asthma with acute exacerbation 4/10/2014    Overview:    • Reflux esophagitis 4/10/2014    Overview:      Past Surgical History:   Procedure Laterality Date   • POLYPECTOMY      from stomach   • THYROIDECTOMY       Social History   Substance Use Topics   • Smoking status: Never Smoker   • Smokeless tobacco: Never Used   • Alcohol use Not on file     Family History   Problem Relation Age of Onset   • Heart disease Mother    • Arrhythmia Mother    • Hypertension Mother        Objective   Vitals:    06/11/19 0831   BP: 116/80   Pulse: 73   Resp: 16   Temp: 36.9 °C (98.5 °F)   Weight: 115 kg (253 lb)   Height: 1.6 m (5' 3\")    Body mass index is 44.82 kg/m².  Physical Exam   Constitutional: She appears well-developed and well-nourished.   HENT:   Mouth/Throat: Oropharynx is clear and moist.   Neck: Normal range of motion.   Cardiovascular: Normal rate, regular rhythm and normal heart sounds.    Pulmonary/Chest: Effort normal.   Abdominal: Soft.   Musculoskeletal: Normal range of motion.   Neurological: She is alert.   Skin: Skin is warm.   Vitals reviewed.      Assessment/Plan   Problem List Items Addressed This Visit     Mild intermittent asthma with acute exacerbation     Pt was switched to Dulera and gets side effects of HA when she takes 2 puffs so she is only able to take 1 puff    Will send the advair again b/c pt is better controlled on this           Relevant Medications    albuterol 2.5 mg /3 mL (0.083 %) nebulizer solution    Colon polyp     Last colon was same as the last EGD   Will need to get sent ( 2017)          Hypothyroid     Lab Results   Component Value Date    TSH 1.230 06/11/2019     Continue same dose of levothyroxine         Malignant melanoma of skin (CMS/HCC) ([facility])     Sees derm regularly          Routine history and physical " examination of adult - Primary     Check labs  Mammogram is up to date  Rx given for November  Had recent stress test that was negative   Colon is up to date  ( get sent from her GI[facility]          Relevant Orders    CBC and Differential (Completed)    Comprehensive metabolic panel (Completed)    Hemoglobin A1c (Completed)    Lipid panel (Completed)    TSH 3rd Generation (Completed)    Urinalysis with microscopic (Completed)    Vitamin B12 (Completed)    Vitamin D 25 hydroxy (Completed)    RESOLVED: Need for hepatitis C screening test     Lab Results   Component Value Date    HEPCAB NON-REACTIVE 09/14/2018              BMI 40.0-44.9, adult (CMS/HCC) (HCC)     Diet/exercise reviewed  Nutrition consult if she would like         RESOLVED: Chest discomfort     Stress test done  Did not get result  Getting faxed today          Anxiety     Stable. No meds         Relevant Orders    TSH 3rd Generation (Completed)    Vitamin B12 (Completed)    Vitamin D 25 hydroxy (Completed)    Arthritis     OA , prn meds          Asthma     On maintenance Dulera.   200/5   1 inh 2 x day /  With 2 puffs BID will get a HA./            Relevant Medications    albuterol 2.5 mg /3 mL (0.083 %) nebulizer solution      Other Visit Diagnoses     Screening mammogram, encounter for        Relevant Orders    BI SCREENING MAMMOGRAM BILATERAL          Patient agrees and verbalizes understanding of medication and treatment plan discussed at today's visit.  Patient was instructed to call or follow-up if symptoms persist or worsen.    No notes on file    Brigitte Andrea,       This note was created with voice recognition software. Inadvertent dictation errors should be disregarded. Please contact my office for clarification.

## 2019-06-12 LAB
25(OH)D3+25(OH)D2 SERPL-MCNC: 32.8 NG/ML (ref 30–100)
ALBUMIN SERPL-MCNC: 4.2 G/DL (ref 3.6–4.8)
ALBUMIN/GLOB SERPL: 1.6 {RATIO} (ref 1.2–2.2)
ALP SERPL-CCNC: 57 IU/L (ref 39–117)
ALT SERPL-CCNC: 15 IU/L (ref 0–32)
APPEARANCE UR: CLEAR
AST SERPL-CCNC: 16 IU/L (ref 0–40)
BACTERIA #/AREA URNS HPF: NORMAL /[HPF]
BASOPHILS # BLD AUTO: 0 X10E3/UL (ref 0–0.2)
BASOPHILS NFR BLD AUTO: 1 %
BILIRUB SERPL-MCNC: 0.4 MG/DL (ref 0–1.2)
BILIRUB UR QL STRIP: NEGATIVE
BUN SERPL-MCNC: 18 MG/DL (ref 8–27)
BUN/CREAT SERPL: 25 (ref 12–28)
CALCIUM SERPL-MCNC: 9.3 MG/DL (ref 8.7–10.3)
CHLORIDE SERPL-SCNC: 103 MMOL/L (ref 96–106)
CHOLEST SERPL-MCNC: 208 MG/DL (ref 100–199)
CO2 SERPL-SCNC: 24 MMOL/L (ref 20–29)
COLOR UR: YELLOW
CREAT SERPL-MCNC: 0.73 MG/DL (ref 0.57–1)
EOSINOPHIL # BLD AUTO: 0.2 X10E3/UL (ref 0–0.4)
EOSINOPHIL NFR BLD AUTO: 3 %
EPI CELLS #/AREA URNS HPF: NORMAL /HPF
ERYTHROCYTE [DISTWIDTH] IN BLOOD BY AUTOMATED COUNT: 13.8 % (ref 12.3–15.4)
GLOBULIN SER CALC-MCNC: 2.6 G/DL (ref 1.5–4.5)
GLUCOSE SERPL-MCNC: 102 MG/DL (ref 65–99)
GLUCOSE UR QL: NEGATIVE
HBA1C MFR BLD: 5.8 % (ref 4.8–5.6)
HCT VFR BLD AUTO: 39 % (ref 34–46.6)
HDLC SERPL-MCNC: 64 MG/DL
HGB BLD-MCNC: 12.8 G/DL (ref 11.1–15.9)
HGB UR QL STRIP: NEGATIVE
IMM GRANULOCYTES # BLD AUTO: 0 X10E3/UL (ref 0–0.1)
IMM GRANULOCYTES NFR BLD AUTO: 0 %
KETONES UR QL STRIP: NEGATIVE
LAB CORP EGFR IF AFRICN AM: 103 ML/MIN/1.73
LAB CORP EGFR IF NONAFRICN AM: 89 ML/MIN/1.73
LDLC SERPL CALC-MCNC: 130 MG/DL (ref 0–99)
LEUKOCYTE ESTERASE UR QL STRIP: NEGATIVE
LYMPHOCYTES # BLD AUTO: 1.7 X10E3/UL (ref 0.7–3.1)
LYMPHOCYTES NFR BLD AUTO: 33 %
MCH RBC QN AUTO: 29.5 PG (ref 26.6–33)
MCHC RBC AUTO-ENTMCNC: 32.8 G/DL (ref 31.5–35.7)
MCV RBC AUTO: 90 FL (ref 79–97)
MICRO URNS: (no result)
MICRO URNS: (no result)
MONOCYTES # BLD AUTO: 0.3 X10E3/UL (ref 0.1–0.9)
MONOCYTES NFR BLD AUTO: 6 %
MUCOUS THREADS URNS QL MICRO: PRESENT
NEUTROPHILS # BLD AUTO: 3.1 X10E3/UL (ref 1.4–7)
NEUTROPHILS NFR BLD AUTO: 57 %
NITRITE UR QL STRIP: NEGATIVE
PH UR STRIP: 7.5 [PH] (ref 5–7.5)
PLATELET # BLD AUTO: 237 X10E3/UL (ref 150–450)
POTASSIUM SERPL-SCNC: 4.7 MMOL/L (ref 3.5–5.2)
PROT SERPL-MCNC: 6.8 G/DL (ref 6–8.5)
PROT UR QL STRIP: NEGATIVE
RBC # BLD AUTO: 4.34 X10E6/UL (ref 3.77–5.28)
RBC #/AREA URNS HPF: NORMAL /HPF
SODIUM SERPL-SCNC: 142 MMOL/L (ref 134–144)
SP GR UR: 1.01 (ref 1–1.03)
TRIGL SERPL-MCNC: 68 MG/DL (ref 0–149)
TSH SERPL DL<=0.005 MIU/L-ACNC: 1.23 UIU/ML (ref 0.45–4.5)
UROBILINOGEN UR STRIP-MCNC: 0.2 EU/DL (ref 0.2–1)
VIT B12 SERPL-MCNC: 399 PG/ML (ref 232–1245)
VLDLC SERPL CALC-MCNC: 14 MG/DL (ref 5–40)
WBC # BLD AUTO: 5.3 X10E3/UL (ref 3.4–10.8)
WBC #/AREA URNS HPF: NORMAL /HPF

## 2019-06-16 PROBLEM — R07.89 CHEST DISCOMFORT: Status: RESOLVED | Noted: 2019-03-25 | Resolved: 2019-06-16

## 2019-06-16 PROBLEM — Z11.59 NEED FOR HEPATITIS C SCREENING TEST: Status: RESOLVED | Noted: 2018-06-16 | Resolved: 2019-06-16

## 2019-06-16 ASSESSMENT — ENCOUNTER SYMPTOMS
NEUROLOGICAL NEGATIVE: 1
EYES NEGATIVE: 1
GASTROINTESTINAL NEGATIVE: 1
COUGH: 1
HEMATOLOGIC/LYMPHATIC NEGATIVE: 1
BACK PAIN: 1
PSYCHIATRIC NEGATIVE: 1

## 2019-06-16 NOTE — ASSESSMENT & PLAN NOTE
Check labs  Mammogram is up to date  Rx given for November  Had recent stress test that was negative   Colon is up to date  ( get sent from her GI[facility]

## 2019-06-16 NOTE — ASSESSMENT & PLAN NOTE
Lab Results   Component Value Date    TSH 1.230 06/11/2019     Continue same dose of levothyroxine

## 2019-06-16 NOTE — ASSESSMENT & PLAN NOTE
Pt was switched to Dulera and gets side effects of HA when she takes 2 puffs so she is only able to take 1 puff    Will send the advair again b/c pt is better controlled on this

## 2019-08-15 DIAGNOSIS — E03.9 ACQUIRED HYPOTHYROIDISM: ICD-10-CM

## 2019-08-15 RX ORDER — LEVOTHYROXINE SODIUM 100 UG/1
TABLET ORAL
Qty: 90 TABLET | Refills: 0 | Status: SHIPPED | OUTPATIENT
Start: 2019-08-15 | End: 2019-11-12 | Stop reason: SDUPTHER

## 2019-08-15 NOTE — TELEPHONE ENCOUNTER
Medicine Refill Request    Last Office Visit: 6/11/2019  Next Office Visit: Visit date not found        Current Outpatient Prescriptions:   •  albuterol 2.5 mg /3 mL (0.083 %) nebulizer solution, Take 3 mL (2.5 mg total) by nebulization every 6 (six) hours as needed for wheezing., Disp: 1 Package, Rfl: 1  •  cholecalciferol, vitamin D3, (VITAMIN D3) 2,000 unit capsule, No SIG Entered, Disp: , Rfl:   •  famotidine (PEPCID) 40 mg tablet, TAKE ONE TABLET BY MOUTH TWICE DAILY, Disp: 180 tablet, Rfl: 4  •  mometasone-formoterol (DULERA) 200-5 mcg/actuation inhaler, Inhale 2 puffs 2 (two) times a day. Rinse mouth with water after use to reduce aftertaste and incidence of candidiasis. Do not swallow., Disp: 13 g, Rfl: 3  •  omeprazole (PriLOSEC) 20 mg capsule, Take 20 mg by mouth daily before breakfast., Disp: , Rfl:   •  SYNTHROID 100 mcg tablet, TAKE 1 TABLET BY MOUTH EVERY DAY, Disp: 90 tablet, Rfl: 0  •  VENTOLIN HFA 90 mcg/actuation inhaler, TAKE 2 PUFFS BY MOUTH EVERY 6 HOURS AS NEEDED FOR WHEEZE, Disp: 18 Inhaler, Rfl: 1      BP Readings from Last 3 Encounters:   06/11/19 116/80   03/25/19 108/70   06/06/18 (!) 144/92       Recent Lab results:  Lab Results   Component Value Date    CHOL 208 (H) 06/11/2019   ,   Lab Results   Component Value Date    HDL 64 06/11/2019   ,   Lab Results   Component Value Date    LDLCALC 130 (H) 06/11/2019   ,   Lab Results   Component Value Date    TRIG 68 06/11/2019        Lab Results   Component Value Date    GLUCOSE 102 (H) 06/11/2019   ,   Lab Results   Component Value Date    HGBA1C 5.8 (H) 06/11/2019         Lab Results   Component Value Date    CREATININE 0.73 06/11/2019       Lab Results   Component Value Date    TSH 1.230 06/11/2019

## 2019-08-15 NOTE — TELEPHONE ENCOUNTER
"Fax received from pharmacy. Synthroid 100 mcg requires prior auth through [employer] as med is \"not on formulary.\"  Form printed and given to (BB).  "

## 2019-09-03 ENCOUNTER — TELEPHONE (OUTPATIENT)
Dept: FAMILY MEDICINE | Facility: CLINIC | Age: 59
End: 2019-09-03

## 2019-09-03 RX ORDER — AZITHROMYCIN 250 MG/1
TABLET, FILM COATED ORAL
Qty: 6 TABLET | Refills: 0 | Status: SHIPPED | OUTPATIENT
Start: 2019-09-03 | End: 2019-09-08

## 2019-09-03 NOTE — TELEPHONE ENCOUNTER
Pt lm - for past 3 weeks nasal congestion, facial pressure and yellow discharge. Thinks it's a sinus infection. Asking for abx sent to [facility]. Pt is out of town

## 2019-09-18 ENCOUNTER — HOSPITAL ENCOUNTER (OUTPATIENT)
Dept: RADIOLOGY | Age: 59
Discharge: HOME | End: 2019-09-18
Attending: FAMILY MEDICINE
Payer: COMMERCIAL

## 2019-09-18 ENCOUNTER — TELEPHONE (OUTPATIENT)
Dept: FAMILY MEDICINE | Facility: CLINIC | Age: 59
End: 2019-09-18

## 2019-09-18 ENCOUNTER — OFFICE VISIT (OUTPATIENT)
Dept: FAMILY MEDICINE | Facility: CLINIC | Age: 59
End: 2019-09-18
Payer: COMMERCIAL

## 2019-09-18 VITALS
DIASTOLIC BLOOD PRESSURE: 84 MMHG | BODY MASS INDEX: 41.57 KG/M2 | RESPIRATION RATE: 18 BRPM | HEIGHT: 63 IN | WEIGHT: 234.6 LBS | SYSTOLIC BLOOD PRESSURE: 122 MMHG | TEMPERATURE: 97.7 F | HEART RATE: 80 BPM

## 2019-09-18 DIAGNOSIS — R09.81 NASAL CONGESTION: ICD-10-CM

## 2019-09-18 DIAGNOSIS — J45.21 MILD INTERMITTENT ASTHMA WITH ACUTE EXACERBATION: ICD-10-CM

## 2019-09-18 DIAGNOSIS — J01.00 ACUTE NON-RECURRENT MAXILLARY SINUSITIS: ICD-10-CM

## 2019-09-18 DIAGNOSIS — E03.9 ACQUIRED HYPOTHYROIDISM: ICD-10-CM

## 2019-09-18 DIAGNOSIS — R53.83 FATIGUE, UNSPECIFIED TYPE: ICD-10-CM

## 2019-09-18 DIAGNOSIS — R05.9 COUGH: ICD-10-CM

## 2019-09-18 DIAGNOSIS — R09.81 NASAL CONGESTION: Primary | ICD-10-CM

## 2019-09-18 DIAGNOSIS — R07.89 CHEST DISCOMFORT: ICD-10-CM

## 2019-09-18 DIAGNOSIS — J45.909 UNCOMPLICATED ASTHMA, UNSPECIFIED ASTHMA SEVERITY, UNSPECIFIED WHETHER PERSISTENT: ICD-10-CM

## 2019-09-18 PROCEDURE — 99213 OFFICE O/P EST LOW 20 MIN: CPT | Performed by: FAMILY MEDICINE

## 2019-09-18 PROCEDURE — 71046 X-RAY EXAM CHEST 2 VIEWS: CPT

## 2019-09-18 PROCEDURE — 36415 COLL VENOUS BLD VENIPUNCTURE: CPT | Performed by: FAMILY MEDICINE

## 2019-09-18 RX ORDER — CLARITHROMYCIN 500 MG/1
1000 TABLET, FILM COATED, EXTENDED RELEASE ORAL DAILY
Qty: 20 TABLET | Refills: 0 | Status: SHIPPED | OUTPATIENT
Start: 2019-09-18 | End: 2019-09-28

## 2019-09-18 RX ORDER — FLUTICASONE PROPIONATE AND SALMETEROL XINAFOATE 115; 21 UG/1; UG/1
2 AEROSOL, METERED RESPIRATORY (INHALATION)
Qty: 12 G | Refills: 0 | Status: SHIPPED | OUTPATIENT
Start: 2019-09-18 | End: 2020-02-07

## 2019-09-18 ASSESSMENT — ENCOUNTER SYMPTOMS
COUGH: 1
WHEEZING: 0
SINUS PRESSURE: 1
CHILLS: 0
FATIGUE: 0
DIZZINESS: 0
PALPITATIONS: 0
HEADACHES: 0
SINUS PAIN: 1
FEVER: 1
APPETITE CHANGE: 0
BRUISES/BLEEDS EASILY: 0
NUMBNESS: 0
ACTIVITY CHANGE: 1
ADENOPATHY: 0

## 2019-09-18 NOTE — ASSESSMENT & PLAN NOTE
Patient had negative exercise stress test  Discussed at her visit today for upper respiratory infection she may occasionally still gets some atypical pain.  Suggested she get a coronary CT scanned to see what her calcium score was and make follow-up appointment.  She is not on a statin.  Her total cholesterol 201, her LDL is 130

## 2019-09-18 NOTE — TELEPHONE ENCOUNTER
"Pharmacist states Advair HFA is NOT covered \"closed formulary\". Please send alternative script.  "

## 2019-09-18 NOTE — PROGRESS NOTES
Subjective      Patient ID: Edmundo Dumas is a 59 y.o. female.    Pt is here for persistant cold sx for about 4 weeks.  She was treated with z pack around 9/3/19.  She felt got better after on the abx..  Never totally resolved.  She has some fatigue.          Tobacco  Meds  Problems  Med Hx  Surg Hx  Fam Hx       Review of Systems   Constitutional: Positive for activity change and fever (earlier in illness ). Negative for appetite change, chills and fatigue.   HENT: Positive for ear pain (right), postnasal drip, sinus pain and sinus pressure.    Respiratory: Positive for cough. Negative for wheezing. Shortness of breath: but less than when normally she gets bronchitis.    Cardiovascular: Negative for chest pain, palpitations and leg swelling.   Neurological: Negative for dizziness, numbness and headaches.   Hematological: Negative for adenopathy. Does not bruise/bleed easily.     Allergies   Allergen Reactions   • Contrast  [Iodinated Contrast Media] Nausea And Vomiting and Dermatitis   • Moxifloxacin Nausea And Vomiting and Dermatitis   • Penicillins      Other reaction(s): Hives   • Sulfa (Sulfonamide Antibiotics) Nausea And Vomiting     Current Outpatient Prescriptions   Medication Sig Dispense Refill   • cholecalciferol, vitamin D3, (VITAMIN D3) 2,000 unit capsule No SIG Entered     • famotidine (PEPCID) 40 mg tablet TAKE ONE TABLET BY MOUTH TWICE DAILY 180 tablet 4   • mometasone-formoterol (DULERA) 100-5 mcg/actuation inhaler Inhale 1 puff 2 (two) times a day. Rinse mouth with water after use to reduce aftertaste and incidence of candidiasis. Do not swallow.     • omeprazole (PriLOSEC) 20 mg capsule Take 20 mg by mouth daily before breakfast.     • SYNTHROID 100 mcg tablet TAKE 1 TABLET BY MOUTH EVERY DAY 90 tablet 0   • VENTOLIN HFA 90 mcg/actuation inhaler TAKE 2 PUFFS BY MOUTH EVERY 6 HOURS AS NEEDED FOR WHEEZE 18 Inhaler 1   • albuterol 2.5 mg /3 mL (0.083 %) nebulizer solution Take 3 mL (2.5 mg  "total) by nebulization every 6 (six) hours as needed for wheezing. 1 Package 1   • clarithromycin XL (BIAXIN XL) 500 mg 24 hr tablet Take 2 tablets (1,000 mg total) by mouth daily for 10 days. 20 tablet 0   • fluticasone propion-salmeterol (ADVAIR HFA) 115-21 mcg/actuation inhaler Inhale 2 puffs 2 (two) times a day. Rinse mouth with water after 12 g 0   • mometasone-formoterol (DULERA) 200-5 mcg/actuation inhaler Inhale 2 puffs 2 (two) times a day. Rinse mouth with water after use to reduce aftertaste and incidence of candidiasis. Do not swallow. 13 g 3     No current facility-administered medications for this visit.      Past Medical History:   Diagnosis Date   • Hx of phlebitis    • Hypothyroid 4/10/2014    Overview:    • Malignant melanoma of skin (CMS/HCC) ([facility]) 4/10/2014    Overview:    • Mild intermittent asthma with acute exacerbation 4/10/2014    Overview:    • Reflux esophagitis 4/10/2014    Overview:      Past Surgical History:   Procedure Laterality Date   • POLYPECTOMY      from stomach   • THYROIDECTOMY       Social History   Substance Use Topics   • Smoking status: Never Smoker   • Smokeless tobacco: Never Used   • Alcohol use Not on file     Family History   Problem Relation Age of Onset   • Heart disease Biological Mother    • Arrhythmia Biological Mother    • Hypertension Biological Mother        Objective   Vitals:    09/18/19 0805   BP: 122/84   Pulse: 80   Resp: 18   Temp: 36.5 °C (97.7 °F)   Weight: 106 kg (234 lb 9.6 oz)   Height: 1.6 m (5' 3\")    Body mass index is 41.56 kg/m².  Physical Exam   Constitutional: She appears well-developed and well-nourished.   HENT:   Head: Normocephalic.   Right Ear: Tympanic membrane is injected.   Left Ear: Tympanic membrane is not injected.   Mouth/Throat: Oropharynx is clear and moist. No oropharyngeal exudate.   Eyes: Pupils are equal, round, and reactive to light. EOM are normal. Right eye exhibits discharge. Left eye exhibits no discharge.   Neck: Normal " range of motion. Neck supple. No thyromegaly present.   Cardiovascular: Normal rate, regular rhythm and normal heart sounds.    No murmur heard.  Pulmonary/Chest: Effort normal. No respiratory distress. She has wheezes.   97%. Peak flow 300   Abdominal: Soft. Bowel sounds are normal. She exhibits no mass. There is no tenderness.   Lymphadenopathy:     She has no cervical adenopathy.   Vitals reviewed.      Assessment/Plan   Problem List Items Addressed This Visit     Mild intermittent asthma with acute exacerbation     Patient is taking her Dulera 1 puff twice daily.  She cannot increase to 2 puffs twice daily due to some side effects of nausea and shakiness.  She had been well controlled on Advair however due to insurance purposes needed to try a different one.  Were not really able to increase this when when she has exacerbations so I have sent over HFA to try to get a prior Auth for this as she has done better with that         Relevant Medications    mometasone-formoterol (DULERA) 100-5 mcg/actuation inhaler    fluticasone propion-salmeterol (ADVAIR HFA) 115-21 mcg/actuation inhaler    Other Relevant Orders    X-RAY CHEST 2 VIEWS (Completed)    Hypothyroid     Lab Results   Component Value Date    TSH 1.230 06/11/2019              Chest discomfort     Patient had negative exercise stress test  Discussed at her visit today for upper respiratory infection she may occasionally still gets some atypical pain.  Suggested she get a coronary CT scanned to see what her calcium score was and make follow-up appointment.  She is not on a statin.  Her total cholesterol 201, her LDL is 130         Relevant Orders    CT HEART CORONARY CALCIUM SCORE    Fatigue     R/o infection as cause of current fatigue  Probable sinusitis  R/o pneumonia          Asthma     Sent new Advair  HFA rx and send for prior auth         Relevant Medications    mometasone-formoterol (DULERA) 100-5 mcg/actuation inhaler    fluticasone  propion-salmeterol (ADVAIR HFA) 115-21 mcg/actuation inhaler    Other Relevant Orders    X-RAY CHEST 2 VIEWS (Completed)    Nasal congestion - Primary     probable sinusitis  Biaxin  mg tablets.  Take 2 tablets daily for 10 days.  Side effects discussed    Start Flonase  Use warm compresses saline spray         Relevant Orders    X-RAY CHEST 2 VIEWS (Completed)    Cough     Biaxin  mg tablets.  2 tablets daily x10 days  Check chest x-ray    Patient with asthma.  Continue Dulera 1 puff twice daily (cannot use increased 2 puffs twice daily due to side effects)  Use albuterol 2 puffs 3 times daily         Relevant Orders    X-RAY CHEST 2 VIEWS (Completed)    CBC and Differential    Acute non-recurrent maxillary sinusitis     Biaxin XL 5 mg tablets.  2 tablets once daily for 10 days    Check chest x-ray as well         Relevant Medications    clarithromycin XL (BIAXIN XL) 500 mg 24 hr tablet          Patient agrees and verbalizes understanding of medication and treatment plan discussed at today's visit.  Patient was instructed to call or follow-up if symptoms persist or worsen.    No notes on file    Brigitte Andrea,       This note was created with voice recognition software. Inadvertent dictation errors should be disregarded. Please contact my office for clarification.

## 2019-09-18 NOTE — ASSESSMENT & PLAN NOTE
probable sinusitis  Biaxin  mg tablets.  Take 2 tablets daily for 10 days.  Side effects discussed    Start Flonase  Use warm compresses saline spray

## 2019-09-18 NOTE — ASSESSMENT & PLAN NOTE
Patient is taking her Dulera 1 puff twice daily.  She cannot increase to 2 puffs twice daily due to some side effects of nausea and shakiness.  She had been well controlled on Advair however due to insurance purposes needed to try a different one.  Were not really able to increase this when when she has exacerbations so I have sent over HFA to try to get a prior Auth for this as she has done better with that

## 2019-09-18 NOTE — TELEPHONE ENCOUNTER
Pharmacist will be faxing over form.  Please keep this until the form comes to make sure I get it  and put on my ledge

## 2019-09-18 NOTE — ASSESSMENT & PLAN NOTE
Biaxin  mg tablets.  2 tablets daily x10 days  Check chest x-ray    Patient with asthma.  Continue Dulera 1 puff twice daily (cannot use increased 2 puffs twice daily due to side effects)  Use albuterol 2 puffs 3 times daily

## 2019-09-19 ENCOUNTER — TELEPHONE (OUTPATIENT)
Dept: FAMILY MEDICINE | Facility: CLINIC | Age: 59
End: 2019-09-19

## 2019-09-19 LAB
BASOPHILS # BLD AUTO: 0 X10E3/UL (ref 0–0.2)
BASOPHILS NFR BLD AUTO: 0 %
EOSINOPHIL # BLD AUTO: 0.1 X10E3/UL (ref 0–0.4)
EOSINOPHIL NFR BLD AUTO: 2 %
ERYTHROCYTE [DISTWIDTH] IN BLOOD BY AUTOMATED COUNT: 13.8 % (ref 12.3–15.4)
HCT VFR BLD AUTO: 44.2 % (ref 34–46.6)
HGB BLD-MCNC: 14.5 G/DL (ref 11.1–15.9)
IMM GRANULOCYTES # BLD AUTO: 0 X10E3/UL (ref 0–0.1)
IMM GRANULOCYTES NFR BLD AUTO: 0 %
LYMPHOCYTES # BLD AUTO: 2.2 X10E3/UL (ref 0.7–3.1)
LYMPHOCYTES NFR BLD AUTO: 30 %
MCH RBC QN AUTO: 29.2 PG (ref 26.6–33)
MCHC RBC AUTO-ENTMCNC: 32.8 G/DL (ref 31.5–35.7)
MCV RBC AUTO: 89 FL (ref 79–97)
MONOCYTES # BLD AUTO: 0.5 X10E3/UL (ref 0.1–0.9)
MONOCYTES NFR BLD AUTO: 7 %
NEUTROPHILS # BLD AUTO: 4.4 X10E3/UL (ref 1.4–7)
NEUTROPHILS NFR BLD AUTO: 61 %
PLATELET # BLD AUTO: 300 X10E3/UL (ref 150–450)
RBC # BLD AUTO: 4.97 X10E6/UL (ref 3.77–5.28)
WBC # BLD AUTO: 7.3 X10E3/UL (ref 3.4–10.8)

## 2019-09-19 NOTE — TELEPHONE ENCOUNTER
Let pt know her CXR was negative and her CBC was normal.  I sent result on her portal this am . How is she feeling?

## 2019-10-11 ENCOUNTER — TELEPHONE (OUTPATIENT)
Dept: FAMILY MEDICINE | Facility: CLINIC | Age: 59
End: 2019-10-11

## 2019-10-11 NOTE — TELEPHONE ENCOUNTER
Spoke with Sary from [employer] stating that a new authorization needed to be created through [employer] for patients inhaler.   was not to go through [employer].  Called provider services and spoke with Katherine.  Advair HFA is not covered on patients plan.  Will have to pay out of pocket.

## 2019-10-14 ENCOUNTER — TELEPHONE (OUTPATIENT)
Dept: FAMILY MEDICINE | Facility: CLINIC | Age: 59
End: 2019-10-14

## 2019-10-14 NOTE — TELEPHONE ENCOUNTER
Patient has bees struggling with sinus issues since Labor Day. Was seen initially and tx with zpack, got worse and tx with Arythromycin. Still not better. Still can not smell or taste. Still has a cough. Please advise.

## 2019-10-29 ENCOUNTER — TELEPHONE (OUTPATIENT)
Dept: FAMILY MEDICINE | Facility: CLINIC | Age: 59
End: 2019-10-29

## 2019-10-30 ENCOUNTER — TELEPHONE (OUTPATIENT)
Dept: FAMILY MEDICINE | Facility: CLINIC | Age: 59
End: 2019-10-30

## 2019-10-30 NOTE — TELEPHONE ENCOUNTER
Spoke with patient.  [facility] has no record of request for Advair HFA.  Reviewed with patient notes from 10/11/19.  Will call [facility] to further investigate.  Called [facility] and spoke to Sary who states they have no record in their system regarding request.    Transferred to PA Line([phone]), spoke with Chel  She is reviewing PA request with pharmacist and they will fax approval/denial within one hour.    Case # [id number]

## 2019-10-30 NOTE — TELEPHONE ENCOUNTER
Received fax from [facility] pharmacy that patient only wants Advair rx and prior authorization would be needed.  Prior authorzation was done previously on 10/11/19 and was denied.  Spoke with pharmacist and they are aware of denial and will contact patient.

## 2019-10-31 NOTE — TELEPHONE ENCOUNTER
Spoke with prior auth dept  and they took verbal  as to reasons that I request approval for her advair HFA . She can not tolerate dry powder inhalers,   We have tried several in past and all cause worsening cough.  She has failed Dulera.  She gets HA when using appropriate dose that she needs to control her symptoms.  She can tolerate 1 puff BID but not 2 puffs BID due to HA.  So she has been taking a suboptimal amt of maintenance med  We have case number and should have an answer by fax by tomorrow.    Please let me know if hear about this tomorrow

## 2019-10-31 NOTE — TELEPHONE ENCOUNTER
Dr. Andrea performed a [id number] review for med and Advair HFA has been approved until 10/30/2020.  Spoke with patient and she is aware and notified [facility] pharmacy as well.

## 2019-11-12 DIAGNOSIS — E03.9 ACQUIRED HYPOTHYROIDISM: ICD-10-CM

## 2019-11-12 RX ORDER — LEVOTHYROXINE SODIUM 100 UG/1
TABLET ORAL
Qty: 90 TABLET | Refills: 0 | Status: SHIPPED | OUTPATIENT
Start: 2019-11-12 | End: 2020-01-20

## 2019-11-12 NOTE — TELEPHONE ENCOUNTER
Medicine Refill Request    Last Office Visit: 9/18/2019  Next Office Visit: Visit date not found        Current Outpatient Medications:   •  albuterol 2.5 mg /3 mL (0.083 %) nebulizer solution, Take 3 mL (2.5 mg total) by nebulization every 6 (six) hours as needed for wheezing., Disp: 1 Package, Rfl: 1  •  cholecalciferol, vitamin D3, (VITAMIN D3) 2,000 unit capsule, No SIG Entered, Disp: , Rfl:   •  famotidine (PEPCID) 40 mg tablet, TAKE ONE TABLET BY MOUTH TWICE DAILY, Disp: 180 tablet, Rfl: 4  •  fluticasone propion-salmeterol (ADVAIR HFA) 115-21 mcg/actuation inhaler, Inhale 2 puffs 2 (two) times a day. Rinse mouth with water after, Disp: 12 g, Rfl: 0  •  mometasone-formoterol (DULERA) 100-5 mcg/actuation inhaler, Inhale 1 puff 2 (two) times a day. Rinse mouth with water after use to reduce aftertaste and incidence of candidiasis. Do not swallow., Disp: , Rfl:   •  mometasone-formoterol (DULERA) 200-5 mcg/actuation inhaler, Inhale 2 puffs 2 (two) times a day. Rinse mouth with water after use to reduce aftertaste and incidence of candidiasis. Do not swallow., Disp: 13 g, Rfl: 3  •  omeprazole (PriLOSEC) 20 mg capsule, Take 20 mg by mouth daily before breakfast., Disp: , Rfl:   •  SYNTHROID 100 mcg tablet, TAKE 1 TABLET BY MOUTH EVERY DAY, Disp: 90 tablet, Rfl: 0  •  VENTOLIN HFA 90 mcg/actuation inhaler, TAKE 2 PUFFS BY MOUTH EVERY 6 HOURS AS NEEDED FOR WHEEZE, Disp: 18 Inhaler, Rfl: 1      BP Readings from Last 3 Encounters:   09/18/19 122/84   06/11/19 116/80   03/25/19 108/70       Recent Lab results:  Lab Results   Component Value Date    CHOL 208 (H) 06/11/2019   ,   Lab Results   Component Value Date    HDL 64 06/11/2019   ,   Lab Results   Component Value Date    LDLCALC 130 (H) 06/11/2019   ,   Lab Results   Component Value Date    TRIG 68 06/11/2019        Lab Results   Component Value Date    GLUCOSE 102 (H) 06/11/2019   ,   Lab Results   Component Value Date    HGBA1C 5.8 (H) 06/11/2019         Lab  Results   Component Value Date    CREATININE 0.73 06/11/2019       Lab Results   Component Value Date    TSH 1.230 06/11/2019

## 2019-12-07 RX ORDER — FAMOTIDINE 40 MG/1
TABLET, FILM COATED ORAL
Qty: 180 TABLET | Refills: 1 | Status: SHIPPED | OUTPATIENT
Start: 2019-12-07 | End: 2020-06-03

## 2020-01-20 DIAGNOSIS — E03.9 ACQUIRED HYPOTHYROIDISM: ICD-10-CM

## 2020-01-20 RX ORDER — LEVOTHYROXINE SODIUM 100 UG/1
TABLET ORAL
Qty: 90 TABLET | Refills: 0 | Status: SHIPPED | OUTPATIENT
Start: 2020-01-20 | End: 2020-04-15

## 2020-01-20 NOTE — TELEPHONE ENCOUNTER
Medicine Refill Request    Last Office Visit: 9/18/2019  Next Office Visit: Visit date not found        Current Outpatient Medications:   •  albuterol 2.5 mg /3 mL (0.083 %) nebulizer solution, Take 3 mL (2.5 mg total) by nebulization every 6 (six) hours as needed for wheezing., Disp: 1 Package, Rfl: 1  •  cholecalciferol, vitamin D3, (VITAMIN D3) 2,000 unit capsule, No SIG Entered, Disp: , Rfl:   •  famotidine (PEPCID) 40 mg tablet, TAKE 1 TABLET BY MOUTH TWICE A DAY, Disp: 180 tablet, Rfl: 1  •  fluticasone propion-salmeterol (ADVAIR HFA) 115-21 mcg/actuation inhaler, Inhale 2 puffs 2 (two) times a day. Rinse mouth with water after, Disp: 12 g, Rfl: 0  •  mometasone-formoterol (DULERA) 100-5 mcg/actuation inhaler, Inhale 1 puff 2 (two) times a day. Rinse mouth with water after use to reduce aftertaste and incidence of candidiasis. Do not swallow., Disp: , Rfl:   •  mometasone-formoterol (DULERA) 200-5 mcg/actuation inhaler, Inhale 2 puffs 2 (two) times a day. Rinse mouth with water after use to reduce aftertaste and incidence of candidiasis. Do not swallow., Disp: 13 g, Rfl: 3  •  omeprazole (PriLOSEC) 20 mg capsule, Take 20 mg by mouth daily before breakfast., Disp: , Rfl:   •  SYNTHROID 100 mcg tablet, TAKE 1 TABLET BY MOUTH EVERY DAY, Disp: 90 tablet, Rfl: 0  •  VENTOLIN HFA 90 mcg/actuation inhaler, TAKE 2 PUFFS BY MOUTH EVERY 6 HOURS AS NEEDED FOR WHEEZE, Disp: 18 Inhaler, Rfl: 1      BP Readings from Last 3 Encounters:   09/18/19 122/84   06/11/19 116/80   03/25/19 108/70       Recent Lab results:  Lab Results   Component Value Date    CHOL 208 (H) 06/11/2019   ,   Lab Results   Component Value Date    HDL 64 06/11/2019   ,   Lab Results   Component Value Date    LDLCALC 130 (H) 06/11/2019   ,   Lab Results   Component Value Date    TRIG 68 06/11/2019        Lab Results   Component Value Date    GLUCOSE 102 (H) 06/11/2019   ,   Lab Results   Component Value Date    HGBA1C 5.8 (H) 06/11/2019         Lab  Results   Component Value Date    CREATININE 0.73 06/11/2019       Lab Results   Component Value Date    TSH 1.230 06/11/2019

## 2020-02-06 DIAGNOSIS — J45.909 UNCOMPLICATED ASTHMA, UNSPECIFIED ASTHMA SEVERITY, UNSPECIFIED WHETHER PERSISTENT: Primary | ICD-10-CM

## 2020-02-07 RX ORDER — FLUTICASONE PROPIONATE AND SALMETEROL XINAFOATE 115; 21 UG/1; UG/1
AEROSOL, METERED RESPIRATORY (INHALATION)
Qty: 45 G | Refills: 1 | Status: SHIPPED | OUTPATIENT
Start: 2020-02-07 | End: 2020-10-19

## 2020-04-15 DIAGNOSIS — E03.9 ACQUIRED HYPOTHYROIDISM: ICD-10-CM

## 2020-04-15 RX ORDER — LEVOTHYROXINE SODIUM 100 UG/1
TABLET ORAL
Qty: 90 TABLET | Refills: 0 | Status: SHIPPED | OUTPATIENT
Start: 2020-04-15 | End: 2020-07-08

## 2020-04-15 NOTE — TELEPHONE ENCOUNTER
Medicine Refill Request    Last Office Visit: 9/18/2019  Next Office Visit: Visit date not found        Current Outpatient Medications:   •  ADVAIR -21 mcg/actuation inhaler, INHALE 2 PUFFS BY MOUTH TWICE A DAY IN THE MORNING AND IN THE EVENING, Disp: 45 g, Rfl: 1  •  albuterol 2.5 mg /3 mL (0.083 %) nebulizer solution, Take 3 mL (2.5 mg total) by nebulization every 6 (six) hours as needed for wheezing., Disp: 1 Package, Rfl: 1  •  cholecalciferol, vitamin D3, (VITAMIN D3) 2,000 unit capsule, No SIG Entered, Disp: , Rfl:   •  famotidine (PEPCID) 40 mg tablet, TAKE 1 TABLET BY MOUTH TWICE A DAY, Disp: 180 tablet, Rfl: 1  •  mometasone-formoterol (DULERA) 200-5 mcg/actuation inhaler, Inhale 2 puffs 2 (two) times a day. Rinse mouth with water after use to reduce aftertaste and incidence of candidiasis. Do not swallow., Disp: 13 g, Rfl: 3  •  omeprazole (PriLOSEC) 20 mg capsule, Take 20 mg by mouth daily before breakfast., Disp: , Rfl:   •  SYNTHROID 100 mcg tablet, TAKE 1 TABLET BY MOUTH EVERY DAY, Disp: 90 tablet, Rfl: 0  •  VENTOLIN HFA 90 mcg/actuation inhaler, TAKE 2 PUFFS BY MOUTH EVERY 6 HOURS AS NEEDED FOR WHEEZE, Disp: 18 Inhaler, Rfl: 1      BP Readings from Last 3 Encounters:   09/18/19 122/84   06/11/19 116/80   03/25/19 108/70       Recent Lab results:  Lab Results   Component Value Date    CHOL 208 (H) 06/11/2019   ,   Lab Results   Component Value Date    HDL 64 06/11/2019   ,   Lab Results   Component Value Date    LDLCALC 130 (H) 06/11/2019   ,   Lab Results   Component Value Date    TRIG 68 06/11/2019        Lab Results   Component Value Date    GLUCOSE 102 (H) 06/11/2019   ,   Lab Results   Component Value Date    HGBA1C 5.8 (H) 06/11/2019         Lab Results   Component Value Date    CREATININE 0.73 06/11/2019       Lab Results   Component Value Date    TSH 1.230 06/11/2019

## 2020-06-03 RX ORDER — FAMOTIDINE 40 MG/1
TABLET, FILM COATED ORAL
Qty: 180 TABLET | Refills: 1 | Status: SHIPPED | OUTPATIENT
Start: 2020-06-03 | End: 2020-12-18

## 2020-06-03 NOTE — TELEPHONE ENCOUNTER
Medicine Refill Request    Last Office Visit: 9/18/2019  Last Telemedicine Visit: Visit date not found    Next Office Visit: Visit date not found  Next Telemedicine Visit: Visit date not found         Current Outpatient Medications:   •  ADVAIR -21 mcg/actuation inhaler, INHALE 2 PUFFS BY MOUTH TWICE A DAY IN THE MORNING AND IN THE EVENING, Disp: 45 g, Rfl: 1  •  albuterol 2.5 mg /3 mL (0.083 %) nebulizer solution, Take 3 mL (2.5 mg total) by nebulization every 6 (six) hours as needed for wheezing., Disp: 1 Package, Rfl: 1  •  cholecalciferol, vitamin D3, (VITAMIN D3) 2,000 unit capsule, No SIG Entered, Disp: , Rfl:   •  famotidine (PEPCID) 40 mg tablet, TAKE 1 TABLET BY MOUTH TWICE A DAY, Disp: 180 tablet, Rfl: 1  •  mometasone-formoterol (DULERA) 200-5 mcg/actuation inhaler, Inhale 2 puffs 2 (two) times a day. Rinse mouth with water after use to reduce aftertaste and incidence of candidiasis. Do not swallow., Disp: 13 g, Rfl: 3  •  omeprazole (PriLOSEC) 20 mg capsule, Take 20 mg by mouth daily before breakfast., Disp: , Rfl:   •  SYNTHROID 100 mcg tablet, TAKE 1 TABLET BY MOUTH EVERY DAY, Disp: 90 tablet, Rfl: 0  •  VENTOLIN HFA 90 mcg/actuation inhaler, TAKE 2 PUFFS BY MOUTH EVERY 6 HOURS AS NEEDED FOR WHEEZE, Disp: 18 Inhaler, Rfl: 1      BP Readings from Last 3 Encounters:   09/18/19 122/84   06/11/19 116/80   03/25/19 108/70       Recent Lab results:  Lab Results   Component Value Date    CHOL 208 (H) 06/11/2019   ,   Lab Results   Component Value Date    HDL 64 06/11/2019   ,   Lab Results   Component Value Date    LDLCALC 130 (H) 06/11/2019   ,   Lab Results   Component Value Date    TRIG 68 06/11/2019        Lab Results   Component Value Date    GLUCOSE 102 (H) 06/11/2019   ,   Lab Results   Component Value Date    HGBA1C 5.8 (H) 06/11/2019         Lab Results   Component Value Date    CREATININE 0.73 06/11/2019       Lab Results   Component Value Date    TSH 1.230 06/11/2019

## 2020-07-07 DIAGNOSIS — E03.9 ACQUIRED HYPOTHYROIDISM: ICD-10-CM

## 2020-07-08 RX ORDER — LEVOTHYROXINE SODIUM 100 UG/1
TABLET ORAL
Qty: 90 TABLET | Refills: 0 | Status: SHIPPED | OUTPATIENT
Start: 2020-07-08 | End: 2020-10-01

## 2020-10-01 ENCOUNTER — TELEPHONE (OUTPATIENT)
Dept: FAMILY MEDICINE | Facility: CLINIC | Age: 60
End: 2020-10-01

## 2020-10-01 DIAGNOSIS — E03.9 ACQUIRED HYPOTHYROIDISM: ICD-10-CM

## 2020-10-01 DIAGNOSIS — E66.01 OBESITY, CLASS III, BMI 40-49.9 (MORBID OBESITY) (CMS/HCC): ICD-10-CM

## 2020-10-01 DIAGNOSIS — J45.909 UNCOMPLICATED ASTHMA, UNSPECIFIED ASTHMA SEVERITY, UNSPECIFIED WHETHER PERSISTENT: Primary | ICD-10-CM

## 2020-10-01 RX ORDER — LEVOTHYROXINE SODIUM 100 UG/1
TABLET ORAL
Qty: 90 TABLET | Refills: 0 | Status: SHIPPED | OUTPATIENT
Start: 2020-10-01 | End: 2020-10-02 | Stop reason: SDUPTHER

## 2020-10-01 NOTE — TELEPHONE ENCOUNTER
Let patient know she is due for all of her lab studies to be done.  Also been a year since in the office so suggest office visit when she can.  I will put the lab order in for her and she can have them done prior to her office visit

## 2020-10-02 RX ORDER — LEVOTHYROXINE SODIUM 100 UG/1
100 TABLET ORAL
Qty: 90 TABLET | Refills: 0 | Status: SHIPPED | OUTPATIENT
Start: 2020-10-02 | End: 2021-01-04

## 2020-10-02 NOTE — TELEPHONE ENCOUNTER
Pharmacy sent message asking for new script for Synthroid to be sent over stating BRAND MEDICALLY NECESSARY.

## 2020-10-19 DIAGNOSIS — J45.909 UNCOMPLICATED ASTHMA, UNSPECIFIED ASTHMA SEVERITY, UNSPECIFIED WHETHER PERSISTENT: ICD-10-CM

## 2020-10-19 RX ORDER — FLUTICASONE PROPIONATE AND SALMETEROL XINAFOATE 115; 21 UG/1; UG/1
AEROSOL, METERED RESPIRATORY (INHALATION)
Qty: 12 G | Refills: 1 | Status: SHIPPED | OUTPATIENT
Start: 2020-10-19 | End: 2021-07-09 | Stop reason: DRUGHIGH

## 2020-10-19 NOTE — TELEPHONE ENCOUNTER
Medicine Refill Request    Last Office Visit: 9/18/2019  Last Telemedicine Visit: Visit date not found    Next Office Visit: Visit date not found  Next Telemedicine Visit: Visit date not found         Current Outpatient Medications:   •  ADVAIR -21 mcg/actuation inhaler, INHALE 2 PUFFS BY MOUTH TWICE A DAY IN THE MORNING AND IN THE EVENING, Disp: 45 g, Rfl: 1  •  albuterol 2.5 mg /3 mL (0.083 %) nebulizer solution, Take 3 mL (2.5 mg total) by nebulization every 6 (six) hours as needed for wheezing., Disp: 1 Package, Rfl: 1  •  cholecalciferol, vitamin D3, (VITAMIN D3) 2,000 unit capsule, No SIG Entered, Disp: , Rfl:   •  famotidine (PEPCID) 40 mg tablet, TAKE 1 TABLET BY MOUTH TWICE A DAY, Disp: 180 tablet, Rfl: 1  •  mometasone-formoterol (DULERA) 200-5 mcg/actuation inhaler, Inhale 2 puffs 2 (two) times a day. Rinse mouth with water after use to reduce aftertaste and incidence of candidiasis. Do not swallow., Disp: 13 g, Rfl: 3  •  omeprazole (PriLOSEC) 20 mg capsule, Take 20 mg by mouth daily before breakfast., Disp: , Rfl:   •  SYNTHROID 100 mcg tablet, Take 1 tablet (100 mcg total) by mouth once daily. BRAND NEC, Disp: 90 tablet, Rfl: 0  •  VENTOLIN HFA 90 mcg/actuation inhaler, TAKE 2 PUFFS BY MOUTH EVERY 6 HOURS AS NEEDED FOR WHEEZE, Disp: 18 Inhaler, Rfl: 1      BP Readings from Last 3 Encounters:   09/18/19 122/84   06/11/19 116/80   03/25/19 108/70       Recent Lab results:  Lab Results   Component Value Date    CHOL 208 (H) 06/11/2019   ,   Lab Results   Component Value Date    HDL 64 06/11/2019   ,   Lab Results   Component Value Date    LDLCALC 130 (H) 06/11/2019   ,   Lab Results   Component Value Date    TRIG 68 06/11/2019        Lab Results   Component Value Date    GLUCOSE 102 (H) 06/11/2019   ,   Lab Results   Component Value Date    HGBA1C 5.8 (H) 06/11/2019         Lab Results   Component Value Date    CREATININE 0.73 06/11/2019       Lab Results   Component Value Date    TSH 1.230  06/11/2019

## 2020-12-18 RX ORDER — FAMOTIDINE 40 MG/1
TABLET, FILM COATED ORAL
Qty: 180 TABLET | Refills: 1 | Status: SHIPPED | OUTPATIENT
Start: 2020-12-18 | End: 2023-03-20 | Stop reason: SDUPTHER

## 2020-12-18 NOTE — TELEPHONE ENCOUNTER
Medicine Refill Request    Last Office Visit: 9/18/2019  Last Telemedicine Visit: Visit date not found    Next Office Visit: 12/30/2020  Next Telemedicine Visit: Visit date not found         Current Outpatient Medications:   •  ADVAIR -21 mcg/actuation inhaler, INHALE 2 PUFFS BY MOUTH TWICE A DAY IN THE MORNING AND IN THE EVENING, Disp: 12 g, Rfl: 1  •  albuterol 2.5 mg /3 mL (0.083 %) nebulizer solution, Take 3 mL (2.5 mg total) by nebulization every 6 (six) hours as needed for wheezing., Disp: 1 Package, Rfl: 1  •  cholecalciferol, vitamin D3, (VITAMIN D3) 2,000 unit capsule, No SIG Entered, Disp: , Rfl:   •  famotidine (PEPCID) 40 mg tablet, TAKE 1 TABLET BY MOUTH TWICE A DAY, Disp: 180 tablet, Rfl: 1  •  mometasone-formoterol (DULERA) 200-5 mcg/actuation inhaler, Inhale 2 puffs 2 (two) times a day. Rinse mouth with water after use to reduce aftertaste and incidence of candidiasis. Do not swallow., Disp: 13 g, Rfl: 3  •  omeprazole (PriLOSEC) 20 mg capsule, Take 20 mg by mouth daily before breakfast., Disp: , Rfl:   •  SYNTHROID 100 mcg tablet, Take 1 tablet (100 mcg total) by mouth once daily. BRAND NEC, Disp: 90 tablet, Rfl: 0  •  VENTOLIN HFA 90 mcg/actuation inhaler, TAKE 2 PUFFS BY MOUTH EVERY 6 HOURS AS NEEDED FOR WHEEZE, Disp: 18 Inhaler, Rfl: 1      BP Readings from Last 3 Encounters:   09/18/19 122/84   06/11/19 116/80   03/25/19 108/70       Recent Lab results:  Lab Results   Component Value Date    CHOL 208 (H) 06/11/2019   ,   Lab Results   Component Value Date    HDL 64 06/11/2019   ,   Lab Results   Component Value Date    LDLCALC 130 (H) 06/11/2019   ,   Lab Results   Component Value Date    TRIG 68 06/11/2019        Lab Results   Component Value Date    GLUCOSE 102 (H) 06/11/2019   ,   Lab Results   Component Value Date    HGBA1C 5.8 (H) 06/11/2019         Lab Results   Component Value Date    CREATININE 0.73 06/11/2019       Lab Results   Component Value Date    TSH 1.230 06/11/2019

## 2020-12-30 ENCOUNTER — OFFICE VISIT (OUTPATIENT)
Dept: FAMILY MEDICINE | Facility: CLINIC | Age: 60
End: 2020-12-30
Payer: COMMERCIAL

## 2020-12-30 VITALS
WEIGHT: 250 LBS | SYSTOLIC BLOOD PRESSURE: 160 MMHG | BODY MASS INDEX: 44.3 KG/M2 | HEART RATE: 75 BPM | TEMPERATURE: 97.3 F | HEIGHT: 63 IN | RESPIRATION RATE: 18 BRPM | DIASTOLIC BLOOD PRESSURE: 90 MMHG | OXYGEN SATURATION: 95 %

## 2020-12-30 DIAGNOSIS — R10.13 EPIGASTRIC PAIN: ICD-10-CM

## 2020-12-30 DIAGNOSIS — R07.89 CHEST DISCOMFORT: ICD-10-CM

## 2020-12-30 DIAGNOSIS — E66.01 OBESITY, CLASS III, BMI 40-49.9 (MORBID OBESITY) (CMS/HCC): ICD-10-CM

## 2020-12-30 DIAGNOSIS — J45.909 UNCOMPLICATED ASTHMA, UNSPECIFIED ASTHMA SEVERITY, UNSPECIFIED WHETHER PERSISTENT: ICD-10-CM

## 2020-12-30 DIAGNOSIS — E03.9 ACQUIRED HYPOTHYROIDISM: ICD-10-CM

## 2020-12-30 DIAGNOSIS — R09.81 NASAL CONGESTION: ICD-10-CM

## 2020-12-30 DIAGNOSIS — J45.21 MILD INTERMITTENT ASTHMA WITH ACUTE EXACERBATION: ICD-10-CM

## 2020-12-30 DIAGNOSIS — K21.00 GASTROESOPHAGEAL REFLUX DISEASE WITH ESOPHAGITIS WITHOUT HEMORRHAGE: ICD-10-CM

## 2020-12-30 DIAGNOSIS — Z00.00 ROUTINE HISTORY AND PHYSICAL EXAMINATION OF ADULT: Primary | ICD-10-CM

## 2020-12-30 DIAGNOSIS — K63.5 POLYP OF COLON, UNSPECIFIED PART OF COLON, UNSPECIFIED TYPE: ICD-10-CM

## 2020-12-30 DIAGNOSIS — C43.9 MALIGNANT MELANOMA OF SKIN (CMS/HCC): ICD-10-CM

## 2020-12-30 DIAGNOSIS — R05.9 COUGH: ICD-10-CM

## 2020-12-30 DIAGNOSIS — Z12.31 SCREENING MAMMOGRAM, ENCOUNTER FOR: ICD-10-CM

## 2020-12-30 DIAGNOSIS — M19.019 OSTEOARTHRITIS OF ACROMIOCLAVICULAR JOINT: ICD-10-CM

## 2020-12-30 DIAGNOSIS — M19.90 ARTHRITIS: ICD-10-CM

## 2020-12-30 DIAGNOSIS — F41.9 ANXIETY: ICD-10-CM

## 2020-12-30 PROBLEM — J01.00 ACUTE NON-RECURRENT MAXILLARY SINUSITIS: Status: RESOLVED | Noted: 2019-09-18 | Resolved: 2020-12-30

## 2020-12-30 PROCEDURE — 99396 PREV VISIT EST AGE 40-64: CPT | Performed by: FAMILY MEDICINE

## 2020-12-30 RX ORDER — OMEPRAZOLE 40 MG/1
40 CAPSULE, DELAYED RELEASE ORAL
Qty: 30 CAPSULE | Refills: 1 | Status: SHIPPED | OUTPATIENT
Start: 2020-12-30 | End: 2021-01-22

## 2020-12-30 RX ORDER — FLUTICASONE PROPIONATE 50 MCG
2 SPRAY, SUSPENSION (ML) NASAL DAILY
Qty: 1 BOTTLE | Refills: 5 | Status: SHIPPED | OUTPATIENT
Start: 2020-12-30 | End: 2024-08-23

## 2020-12-30 ASSESSMENT — ENCOUNTER SYMPTOMS
ACTIVITY CHANGE: 0
APPETITE CHANGE: 0
MUSCULOSKELETAL NEGATIVE: 1
RESPIRATORY NEGATIVE: 1
CARDIOVASCULAR NEGATIVE: 1
HEMATOLOGIC/LYMPHATIC NEGATIVE: 1
GASTROINTESTINAL NEGATIVE: 1
CHILLS: 0
DIAPHORESIS: 0
CONSTITUTIONAL NEGATIVE: 1
EYES NEGATIVE: 1
UNEXPECTED WEIGHT CHANGE: 0
PSYCHIATRIC NEGATIVE: 1
ALLERGIC/IMMUNOLOGIC NEGATIVE: 1
ENDOCRINE NEGATIVE: 1
FEVER: 0
NEUROLOGICAL NEGATIVE: 1
FATIGUE: 0

## 2020-12-30 NOTE — ASSESSMENT & PLAN NOTE
See below for plan.  Omeprazole 40 mg daily  X 2 months then decrease to 20 mg daily.  Follow-up in 4 months

## 2020-12-30 NOTE — ASSESSMENT & PLAN NOTE
With eating or drinking water has been getting this.  She had an EGD in Aug  2017  Had some esophagitis.  She has been on famotidine 40 mg BID  She just returned to omeprazole 20 mg for 5 days with help.  I have suggested omeprazole 40 mg daily for 2 months then rtn to 20 mg omeprazole   And follow up in 3-4 months for update

## 2020-12-30 NOTE — PATIENT INSTRUCTIONS
Pneumovax  -    Recommend with the flu shot    Recommend flu shotPatient Education     Food Choices for Gastroesophageal Reflux Disease, Adult  When you have gastroesophageal reflux disease (GERD), the foods you eat and your eating habits are very important. Choosing the right foods can help ease your discomfort. Think about working with a nutrition specialist (dietitian) to help you make good choices.  What are tips for following this plan?    Meals  · Choose healthy foods that are low in fat, such as fruits, vegetables, whole grains, low-fat dairy products, and lean meat, fish, and poultry.  · Eat small meals often instead of 3 large meals a day. Eat your meals slowly, and in a place where you are relaxed. Avoid bending over or lying down until 2-3 hours after eating.  · Avoid eating meals 2-3 hours before bed.  · Avoid drinking a lot of liquid with meals.  · Cook foods using methods other than frying. Bake, grill, or broil food instead.  · Avoid or limit:  ? Chocolate.  ? Peppermint or spearmint.  ? Alcohol.  ? Pepper.  ? Black and decaffeinated coffee.  ? Black and decaffeinated tea.  ? Bubbly (carbonated) soft drinks.  ? Caffeinated energy drinks and soft drinks.  · Limit high-fat foods such as:  ? Fatty meat or fried foods.  ? Whole milk, cream, butter, or ice cream.  ? Nuts and nut butters.  ? Pastries, donuts, and sweets made with butter or shortening.  · Avoid foods that cause symptoms. These foods may be different for everyone. Common foods that cause symptoms include:  ? Tomatoes.  ? Oranges, nora, and limes.  ? Peppers.  ? Spicy food.  ? Onions and garlic.  ? Vinegar.  Lifestyle  · Maintain a healthy weight. Ask your doctor what weight is healthy for you. If you need to lose weight, work with your doctor to do so safely.  · Exercise for at least 30 minutes for 5 or more days each week, or as told by your doctor.  · Wear loose-fitting clothes.  · Do not smoke. If you need help quitting, ask your  doctor.  · Sleep with the head of your bed higher than your feet. Use a wedge under the mattress or blocks under the bed frame to raise the head of the bed.  Summary  · When you have gastroesophageal reflux disease (GERD), food and lifestyle choices are very important in easing your symptoms.  · Eat small meals often instead of 3 large meals a day. Eat your meals slowly, and in a place where you are relaxed.  · Limit high-fat foods such as fatty meat or fried foods.  · Avoid bending over or lying down until 2-3 hours after eating.  · Avoid peppermint and spearmint, caffeine, alcohol, and chocolate.  This information is not intended to replace advice given to you by your health care provider. Make sure you discuss any questions you have with your health care provider.  Document Released: 06/18/2013 Document Revised: 04/09/2020 Document Reviewed: 01/23/2018  Elsevier Patient Education © 2020 [employer]

## 2020-12-30 NOTE — ASSESSMENT & PLAN NOTE
Better back on the Advair 2 puffs BID  ( did not do well on dulera - side effects)  Has prn albuteral

## 2020-12-30 NOTE — ASSESSMENT & PLAN NOTE
Check routine labs today  Prescription for mammogram given  Suggested she make an appointment with her GYN for her gynecologic exam  Suggest she get a flu shot as well as a Pneumovax  For her asthma.  We do not have the flu shot here so she is going to get that at her pharmacy.  Suggest she really consider getting the Pneumovax as well

## 2020-12-30 NOTE — ASSESSMENT & PLAN NOTE
Lab Results   Component Value Date    TSH 1.230 06/11/2019     Check levels.  Continue current dose

## 2020-12-30 NOTE — PROGRESS NOTES
Subjective      Patient ID: Edmundo Dumas is a 60 y.o. female.    Pt is here for her physical.  She is doing pretty well.  She has not been as active. She has 2 grandkids in [place], she does admit that she has been having a lot of poor eating over the pandemic and not exercising as much.  She has had some weight gain.  Although her weight is down about 3 pounds at 250 from June 2019.      Problems  Med Hx  Surg Hx  Fam Hx       Review of Systems   Constitutional: Negative.  Negative for activity change, appetite change, chills, diaphoresis, fatigue, fever and unexpected weight change.   HENT: Negative.    Eyes: Negative.    Respiratory: Negative.    Cardiovascular: Negative.    Gastrointestinal: Negative.    Endocrine: Negative.    Genitourinary: Negative.    Musculoskeletal: Negative.    Skin: Negative.    Allergic/Immunologic: Negative.    Neurological: Negative.    Hematological: Negative.    Psychiatric/Behavioral: Negative.      Allergies   Allergen Reactions   • Contrast  [Iodinated Contrast Media] Nausea And Vomiting and Dermatitis   • Moxifloxacin Nausea And Vomiting and Dermatitis   • Penicillins      Other reaction(s): Hives   • Sulfa (Sulfonamide Antibiotics) Nausea And Vomiting     Current Outpatient Medications   Medication Sig Dispense Refill   • ADVAIR -21 mcg/actuation inhaler INHALE 2 PUFFS BY MOUTH TWICE A DAY IN THE MORNING AND IN THE EVENING 12 g 1   • cholecalciferol, vitamin D3, (VITAMIN D3) 2,000 unit capsule No SIG Entered     • famotidine (PEPCID) 40 mg tablet TAKE 1 TABLET BY MOUTH TWICE A  tablet 1   • omeprazole (PriLOSEC) 20 mg capsule Take 20 mg by mouth daily before breakfast.     • SYNTHROID 100 mcg tablet Take 1 tablet (100 mcg total) by mouth once daily. BRAND NEC 90 tablet 0   • VENTOLIN HFA 90 mcg/actuation inhaler TAKE 2 PUFFS BY MOUTH EVERY 6 HOURS AS NEEDED FOR WHEEZE 18 Inhaler 1   • albuterol 2.5 mg /3 mL (0.083 %) nebulizer solution Take 3 mL (2.5  "mg total) by nebulization every 6 (six) hours as needed for wheezing. 1 Package 1   • fluticasone propionate (FLONASE) 50 mcg/actuation nasal spray Administer 2 sprays into each nostril daily. 1 Bottle 5   • omeprazole (PriLOSEC) 40 mg capsule Take 1 capsule (40 mg total) by mouth daily before breakfast. 30 capsule 1     No current facility-administered medications for this visit.      Past Medical History:   Diagnosis Date   • Hx of phlebitis    • Hypothyroid 4/10/2014    Overview:    • Malignant melanoma of skin (CMS/HCC) 4/10/2014    Overview:    • Mild intermittent asthma with acute exacerbation 4/10/2014    Overview:    • Reflux esophagitis 4/10/2014    Overview:      Past Surgical History:   Procedure Laterality Date   • POLYPECTOMY      from stomach   • THYROIDECTOMY       Social History     Tobacco Use   • Smoking status: Never Smoker   • Smokeless tobacco: Never Used   Substance Use Topics   • Alcohol use: Not on file   • Drug use: Not on file     Family History   Problem Relation Age of Onset   • Heart disease Biological Mother    • Arrhythmia Biological Mother    • Hypertension Biological Mother        Objective   Vitals:    12/30/20 0757 12/30/20 0846   BP: 122/82 (!) 160/90   Pulse: 75    Resp: 18    Temp: 36.3 °C (97.3 °F)    SpO2: 95%    Weight: 113 kg (250 lb)    Height: 1.6 m (5' 3\")     Body mass index is 44.29 kg/m².  Physical Exam  Vitals signs reviewed.   Constitutional:       General: She is not in acute distress.     Appearance: Normal appearance. She is obese. She is not ill-appearing.   HENT:      Head: Normocephalic and atraumatic.      Right Ear: Tympanic membrane and ear canal normal.      Left Ear: Tympanic membrane and ear canal normal.      Nose: Nose normal.      Mouth/Throat:      Mouth: Mucous membranes are dry.   Eyes:      Extraocular Movements: Extraocular movements intact.      Pupils: Pupils are equal, round, and reactive to light.   Neck:      Musculoskeletal: Normal range of " motion and neck supple.      Vascular: No carotid bruit.   Cardiovascular:      Rate and Rhythm: Normal rate and regular rhythm.      Pulses: Normal pulses.      Heart sounds: Normal heart sounds.   Pulmonary:      Effort: Pulmonary effort is normal.      Breath sounds: Normal breath sounds. No wheezing or rhonchi.   Abdominal:      General: Abdomen is flat. There is no distension.      Palpations: Abdomen is soft. There is no mass.      Tenderness: There is no abdominal tenderness.      Hernia: No hernia is present.   Musculoskeletal: Normal range of motion.         General: No swelling or tenderness.   Skin:     General: Skin is warm and dry.      Findings: No bruising or erythema.   Neurological:      General: No focal deficit present.      Mental Status: She is alert and oriented to person, place, and time.   Psychiatric:         Mood and Affect: Mood normal.         Behavior: Behavior normal.         Assessment/Plan   Problem List Items Addressed This Visit        Nervous    Epigastric pain     With eating or drinking water has been getting this.  She had an EGD in Aug  2017  Had some esophagitis.  She has been on famotidine 40 mg BID  She just returned to omeprazole 20 mg for 5 days with help.  I have suggested omeprazole 40 mg daily for 2 months then rtn to 20 mg omeprazole   And follow up in 3-4 months for update           Relevant Orders    FIT    RESOLVED: Chest discomfort       Respiratory    Mild intermittent asthma with acute exacerbation     Better back on the Advair 2 puffs BID  ( did not do well on dulera - side effects)  Has prn albuteral          Relevant Medications    fluticasone propionate (FLONASE) 50 mcg/actuation nasal spray    Other Relevant Orders    CBC and Differential    Comprehensive metabolic panel    Lipid panel    TSH 3rd Generation    Urinalysis with microscopic    Hemoglobin A1c    Asthma     On Advair inhaler ( she can not take the Diskus)   she has as needed albuterol          Relevant Orders    CBC and Differential    Comprehensive metabolic panel    Lipid panel    TSH 3rd Generation    Urinalysis with microscopic    Hemoglobin A1c    Nasal congestion     Pt is havein mucous in back of throat/clearing  Suggested flonase trial          Cough     Secondary to asthma             Digestive    Colon polyp     Had in 2017  (  2 polyps then )  Due in 5 years         Reflux esophagitis     See below for plan.  Omeprazole 40 mg daily  X 2 months then decrease to 20 mg daily.  Follow-up in 4 months         Relevant Medications    omeprazole (PriLOSEC) 40 mg capsule    Other Relevant Orders    CBC and Differential    Comprehensive metabolic panel    Lipid panel    TSH 3rd Generation    Urinalysis with microscopic    Hemoglobin A1c    FIT       Musculoskeletal    Arthritis     OA. Prn meds  Pt is trying some OTC collagen          Osteoarthritis of acromioclavicular joint       Endocrine/Metabolic    Hypothyroid     Lab Results   Component Value Date    TSH 1.230 06/11/2019     Check levels.  Continue current dose          Relevant Orders    TSH 3rd Generation    Obesity, Class III, BMI 40-49.9 (morbid obesity) (CMS/HCC)     As per HPI   Starting better diet etc  Follow up in 4 months for weight check as well as blood pressure check         Relevant Orders    CBC and Differential    Comprehensive metabolic panel    Lipid panel    TSH 3rd Generation    Urinalysis with microscopic    Hemoglobin A1c    BMI 40.0-44.9, adult (CMS/HCC)     Patient is planning with her  to diet and exercise more  Pt struggles with weight              Other    Malignant melanoma of skin (CMS/HCC)     Follow dermatology regularly         Routine history and physical examination of adult - Primary     Check routine labs today  Prescription for mammogram given  Suggested she make an appointment with her GYN for her gynecologic exam  Suggest she get a flu shot as well as a Pneumovax  For her asthma.  We do not have the  flu shot here so she is going to get that at her pharmacy.  Suggest she really consider getting the Pneumovax as well           Relevant Orders    CBC and Differential    Comprehensive metabolic panel    Lipid panel    TSH 3rd Generation    Urinalysis with microscopic    Hemoglobin A1c    Anxiety     Stable. No meds            Other Visit Diagnoses     Screening mammogram, encounter for        Relevant Orders    BI SCREENING MAMMOGRAM BILATERAL(TOMOSYNTHESIS)          Patient agrees and verbalizes understanding of medication and treatment plan discussed at today's visit.  Patient was instructed to call or follow-up if symptoms persist or worsen.    No notes on file    Brigitte Andrea,       This note was created with voice recognition software. Inadvertent dictation errors should be disregarded. Please contact my office for clarification.

## 2020-12-30 NOTE — ASSESSMENT & PLAN NOTE
As per HPI   Starting better diet etc  Follow up in 4 months for weight check as well as blood pressure check

## 2020-12-31 LAB
ALBUMIN SERPL-MCNC: 4.3 G/DL (ref 3.8–4.8)
ALBUMIN/GLOB SERPL: 1.7 {RATIO} (ref 1.2–2.2)
ALP SERPL-CCNC: 67 IU/L (ref 39–117)
ALT SERPL-CCNC: 20 IU/L (ref 0–32)
APPEARANCE UR: CLEAR
AST SERPL-CCNC: 20 IU/L (ref 0–40)
BACTERIA #/AREA URNS HPF: NORMAL /[HPF]
BASOPHILS # BLD AUTO: 0 X10E3/UL (ref 0–0.2)
BASOPHILS NFR BLD AUTO: 1 %
BILIRUB SERPL-MCNC: 0.4 MG/DL (ref 0–1.2)
BILIRUB UR QL STRIP: NEGATIVE
BUN SERPL-MCNC: 19 MG/DL (ref 8–27)
BUN/CREAT SERPL: 26 (ref 12–28)
CALCIUM SERPL-MCNC: 9.3 MG/DL (ref 8.7–10.3)
CHLORIDE SERPL-SCNC: 105 MMOL/L (ref 96–106)
CHOLEST SERPL-MCNC: 211 MG/DL (ref 100–199)
CO2 SERPL-SCNC: 25 MMOL/L (ref 20–29)
COLOR UR: YELLOW
CREAT SERPL-MCNC: 0.72 MG/DL (ref 0.57–1)
EOSINOPHIL # BLD AUTO: 0.1 X10E3/UL (ref 0–0.4)
EOSINOPHIL NFR BLD AUTO: 2 %
EPI CELLS #/AREA URNS HPF: NORMAL /HPF (ref 0–10)
ERYTHROCYTE [DISTWIDTH] IN BLOOD BY AUTOMATED COUNT: 12.5 % (ref 11.7–15.4)
GLOBULIN SER CALC-MCNC: 2.6 G/DL (ref 1.5–4.5)
GLUCOSE SERPL-MCNC: 103 MG/DL (ref 65–99)
GLUCOSE UR QL: NEGATIVE
HBA1C MFR BLD: 5.8 % (ref 4.8–5.6)
HCT VFR BLD AUTO: 40.5 % (ref 34–46.6)
HDLC SERPL-MCNC: 74 MG/DL
HGB BLD-MCNC: 13.8 G/DL (ref 11.1–15.9)
HGB UR QL STRIP: NEGATIVE
IMM GRANULOCYTES # BLD AUTO: 0 X10E3/UL (ref 0–0.1)
IMM GRANULOCYTES NFR BLD AUTO: 0 %
KETONES UR QL STRIP: NEGATIVE
LAB CORP EGFR IF AFRICN AM: 104 ML/MIN/1.73
LAB CORP EGFR IF NONAFRICN AM: 90 ML/MIN/1.73
LDLC SERPL CALC-MCNC: 124 MG/DL (ref 0–99)
LEUKOCYTE ESTERASE UR QL STRIP: NEGATIVE
LYMPHOCYTES # BLD AUTO: 1.7 X10E3/UL (ref 0.7–3.1)
LYMPHOCYTES NFR BLD AUTO: 28 %
MCH RBC QN AUTO: 30.7 PG (ref 26.6–33)
MCHC RBC AUTO-ENTMCNC: 34.1 G/DL (ref 31.5–35.7)
MCV RBC AUTO: 90 FL (ref 79–97)
MICRO URNS: NORMAL
MICRO URNS: NORMAL
MONOCYTES # BLD AUTO: 0.3 X10E3/UL (ref 0.1–0.9)
MONOCYTES NFR BLD AUTO: 4 %
MUCOUS THREADS URNS QL MICRO: PRESENT
NEUTROPHILS # BLD AUTO: 3.8 X10E3/UL (ref 1.4–7)
NEUTROPHILS NFR BLD AUTO: 65 %
NITRITE UR QL STRIP: NEGATIVE
PH UR STRIP: 7 [PH] (ref 5–7.5)
PLATELET # BLD AUTO: 232 X10E3/UL (ref 150–450)
POTASSIUM SERPL-SCNC: 4.7 MMOL/L (ref 3.5–5.2)
PROT SERPL-MCNC: 6.9 G/DL (ref 6–8.5)
PROT UR QL STRIP: NEGATIVE
RBC # BLD AUTO: 4.5 X10E6/UL (ref 3.77–5.28)
RBC #/AREA URNS HPF: NORMAL /HPF (ref 0–2)
SODIUM SERPL-SCNC: 143 MMOL/L (ref 134–144)
SP GR UR: 1.01 (ref 1–1.03)
TRIGL SERPL-MCNC: 74 MG/DL (ref 0–149)
TSH SERPL DL<=0.005 MIU/L-ACNC: 1.18 UIU/ML (ref 0.45–4.5)
UROBILINOGEN UR STRIP-MCNC: 0.2 MG/DL (ref 0.2–1)
VLDLC SERPL CALC-MCNC: 13 MG/DL (ref 5–40)
WBC # BLD AUTO: 5.9 X10E3/UL (ref 3.4–10.8)
WBC #/AREA URNS HPF: NORMAL /HPF (ref 0–5)

## 2021-01-01 DIAGNOSIS — E03.9 ACQUIRED HYPOTHYROIDISM: ICD-10-CM

## 2021-01-04 RX ORDER — LEVOTHYROXINE SODIUM 100 UG/1
TABLET ORAL
Qty: 90 TABLET | Refills: 0 | Status: SHIPPED | OUTPATIENT
Start: 2021-01-04 | End: 2021-04-02

## 2021-01-04 NOTE — TELEPHONE ENCOUNTER
Medicine Refill Request    Last Office Visit: 12/30/2020  Last Telemedicine Visit: Visit date not found    Next Office Visit: Visit date not found  Next Telemedicine Visit: Visit date not found         Current Outpatient Medications:   •  ADVAIR -21 mcg/actuation inhaler, INHALE 2 PUFFS BY MOUTH TWICE A DAY IN THE MORNING AND IN THE EVENING, Disp: 12 g, Rfl: 1  •  albuterol 2.5 mg /3 mL (0.083 %) nebulizer solution, Take 3 mL (2.5 mg total) by nebulization every 6 (six) hours as needed for wheezing., Disp: 1 Package, Rfl: 1  •  cholecalciferol, vitamin D3, (VITAMIN D3) 2,000 unit capsule, No SIG Entered, Disp: , Rfl:   •  famotidine (PEPCID) 40 mg tablet, TAKE 1 TABLET BY MOUTH TWICE A DAY, Disp: 180 tablet, Rfl: 1  •  fluticasone propionate (FLONASE) 50 mcg/actuation nasal spray, Administer 2 sprays into each nostril daily., Disp: 1 Bottle, Rfl: 5  •  omeprazole (PriLOSEC) 20 mg capsule, Take 20 mg by mouth daily before breakfast., Disp: , Rfl:   •  omeprazole (PriLOSEC) 40 mg capsule, Take 1 capsule (40 mg total) by mouth daily before breakfast., Disp: 30 capsule, Rfl: 1  •  SYNTHROID 100 mcg tablet, Take 1 tablet (100 mcg total) by mouth once daily. BRAND NEC, Disp: 90 tablet, Rfl: 0  •  VENTOLIN HFA 90 mcg/actuation inhaler, TAKE 2 PUFFS BY MOUTH EVERY 6 HOURS AS NEEDED FOR WHEEZE, Disp: 18 Inhaler, Rfl: 1      BP Readings from Last 3 Encounters:   12/30/20 (!) 160/90   09/18/19 122/84   06/11/19 116/80       Recent Lab results:  Lab Results   Component Value Date    CHOL 211 (H) 12/30/2020   ,   Lab Results   Component Value Date    HDL 74 12/30/2020   ,   Lab Results   Component Value Date    LDLCALC 124 (H) 12/30/2020   ,   Lab Results   Component Value Date    TRIG 74 12/30/2020        Lab Results   Component Value Date    GLUCOSE 103 (H) 12/30/2020   ,   Lab Results   Component Value Date    HGBA1C 5.8 (H) 12/30/2020         Lab Results   Component Value Date    CREATININE 0.72 12/30/2020       Lab  Results   Component Value Date    TSH 1.180 12/30/2020

## 2021-01-22 RX ORDER — OMEPRAZOLE 40 MG/1
CAPSULE, DELAYED RELEASE ORAL
Qty: 30 CAPSULE | Refills: 1 | Status: SHIPPED | OUTPATIENT
Start: 2021-01-22 | End: 2024-12-10 | Stop reason: DRUGHIGH

## 2021-01-22 NOTE — TELEPHONE ENCOUNTER
Medicine Refill Request    Last Office Visit: 12/30/2020  Last Telemedicine Visit: Visit date not found    Next Office Visit: Visit date not found  Next Telemedicine Visit: Visit date not found         Current Outpatient Medications:   •  ADVAIR -21 mcg/actuation inhaler, INHALE 2 PUFFS BY MOUTH TWICE A DAY IN THE MORNING AND IN THE EVENING, Disp: 12 g, Rfl: 1  •  albuterol 2.5 mg /3 mL (0.083 %) nebulizer solution, Take 3 mL (2.5 mg total) by nebulization every 6 (six) hours as needed for wheezing., Disp: 1 Package, Rfl: 1  •  cholecalciferol, vitamin D3, (VITAMIN D3) 2,000 unit capsule, No SIG Entered, Disp: , Rfl:   •  famotidine (PEPCID) 40 mg tablet, TAKE 1 TABLET BY MOUTH TWICE A DAY, Disp: 180 tablet, Rfl: 1  •  fluticasone propionate (FLONASE) 50 mcg/actuation nasal spray, Administer 2 sprays into each nostril daily., Disp: 1 Bottle, Rfl: 5  •  omeprazole (PriLOSEC) 20 mg capsule, Take 20 mg by mouth daily before breakfast., Disp: , Rfl:   •  omeprazole (PriLOSEC) 40 mg capsule, Take 1 capsule (40 mg total) by mouth daily before breakfast., Disp: 30 capsule, Rfl: 1  •  SYNTHROID 100 mcg tablet, TAKE 1 TABLET BY MOUTH ONCE DAILY. BRAND NECESSARY., Disp: 90 tablet, Rfl: 0  •  VENTOLIN HFA 90 mcg/actuation inhaler, TAKE 2 PUFFS BY MOUTH EVERY 6 HOURS AS NEEDED FOR WHEEZE, Disp: 18 Inhaler, Rfl: 1      BP Readings from Last 3 Encounters:   12/30/20 (!) 160/90   09/18/19 122/84   06/11/19 116/80       Recent Lab results:  Lab Results   Component Value Date    CHOL 211 (H) 12/30/2020   ,   Lab Results   Component Value Date    HDL 74 12/30/2020   ,   Lab Results   Component Value Date    LDLCALC 124 (H) 12/30/2020   ,   Lab Results   Component Value Date    TRIG 74 12/30/2020        Lab Results   Component Value Date    GLUCOSE 103 (H) 12/30/2020   ,   Lab Results   Component Value Date    HGBA1C 5.8 (H) 12/30/2020         Lab Results   Component Value Date    CREATININE 0.72 12/30/2020       Lab Results    Component Value Date    TSH 1.180 12/30/2020

## 2021-03-25 ENCOUNTER — HOSPITAL ENCOUNTER (OUTPATIENT)
Dept: RADIOLOGY | Age: 61
Discharge: HOME | End: 2021-03-25
Attending: FAMILY MEDICINE
Payer: COMMERCIAL

## 2021-03-25 ENCOUNTER — OFFICE VISIT (OUTPATIENT)
Dept: FAMILY MEDICINE | Facility: CLINIC | Age: 61
End: 2021-03-25
Payer: COMMERCIAL

## 2021-03-25 VITALS
RESPIRATION RATE: 16 BRPM | HEART RATE: 84 BPM | HEIGHT: 63 IN | BODY MASS INDEX: 44.47 KG/M2 | SYSTOLIC BLOOD PRESSURE: 122 MMHG | TEMPERATURE: 97.7 F | WEIGHT: 251 LBS | OXYGEN SATURATION: 97 % | DIASTOLIC BLOOD PRESSURE: 70 MMHG

## 2021-03-25 DIAGNOSIS — R55 VASOVAGAL SYNCOPE: ICD-10-CM

## 2021-03-25 DIAGNOSIS — R10.31 RIGHT GROIN PAIN: ICD-10-CM

## 2021-03-25 DIAGNOSIS — R10.31 RIGHT GROIN PAIN: Primary | ICD-10-CM

## 2021-03-25 DIAGNOSIS — C43.9 MALIGNANT MELANOMA OF SKIN (CMS/HCC): ICD-10-CM

## 2021-03-25 DIAGNOSIS — E66.01 OBESITY, CLASS III, BMI 40-49.9 (MORBID OBESITY) (CMS/HCC): ICD-10-CM

## 2021-03-25 DIAGNOSIS — J45.21 MILD INTERMITTENT ASTHMA WITH ACUTE EXACERBATION: ICD-10-CM

## 2021-03-25 PROCEDURE — 3008F BODY MASS INDEX DOCD: CPT | Performed by: FAMILY MEDICINE

## 2021-03-25 PROCEDURE — 73502 X-RAY EXAM HIP UNI 2-3 VIEWS: CPT | Mod: RT

## 2021-03-25 PROCEDURE — 99213 OFFICE O/P EST LOW 20 MIN: CPT | Performed by: FAMILY MEDICINE

## 2021-03-25 NOTE — PROGRESS NOTES
Subjective      Patient ID: Edmundo Dumas is a 61 y.o. female.    Pt is here for right buttock pain.she was .walking and her knee hit something and then her hip got stopped abruptly and she thinks she heard a POP.  Now has pain in the buttock area.  Also she notes it is harder to sit than to stand.             Review of Systems   Constitutional: Negative.    HENT: Negative.    Respiratory: Negative.    Cardiovascular: Negative.      Allergies   Allergen Reactions   • Contrast  [Iodinated Contrast Media] Nausea And Vomiting and Dermatitis   • Moxifloxacin Nausea And Vomiting and Dermatitis   • Penicillins      Other reaction(s): Hives   • Sulfa (Sulfonamide Antibiotics) Nausea And Vomiting     Current Outpatient Medications   Medication Sig Dispense Refill   • ADVAIR -21 mcg/actuation inhaler INHALE 2 PUFFS BY MOUTH TWICE A DAY IN THE MORNING AND IN THE EVENING 12 g 1   • cholecalciferol, vitamin D3, (VITAMIN D3) 2,000 unit capsule No SIG Entered     • famotidine (PEPCID) 40 mg tablet TAKE 1 TABLET BY MOUTH TWICE A  tablet 1   • fluticasone propionate (FLONASE) 50 mcg/actuation nasal spray Administer 2 sprays into each nostril daily. (Patient taking differently: Administer 2 sprays into each nostril daily as needed.  ) 1 Bottle 5   • omeprazole (PriLOSEC) 40 mg capsule TAKE 1 CAPSULE BY MOUTH EVERY DAY BEFORE BREAKFAST 30 capsule 1   • SYNTHROID 100 mcg tablet TAKE 1 TABLET BY MOUTH ONCE DAILY. BRAND NECESSARY. 90 tablet 0   • VENTOLIN HFA 90 mcg/actuation inhaler TAKE 2 PUFFS BY MOUTH EVERY 6 HOURS AS NEEDED FOR WHEEZE 18 Inhaler 1   • albuterol 2.5 mg /3 mL (0.083 %) nebulizer solution Take 3 mL (2.5 mg total) by nebulization every 6 (six) hours as needed for wheezing. 1 Package 1     No current facility-administered medications for this visit.      Past Medical History:   Diagnosis Date   • Hx of phlebitis    • Hypothyroid 4/10/2014    Overview:    • Malignant melanoma of skin (CMS/HCC) 4/10/2014  "   Overview:    • Mild intermittent asthma with acute exacerbation 4/10/2014    Overview:    • Reflux esophagitis 4/10/2014    Overview:      Past Surgical History:   Procedure Laterality Date   • POLYPECTOMY      from stomach   • THYROIDECTOMY       Social History     Tobacco Use   • Smoking status: Never Smoker   • Smokeless tobacco: Never Used   Substance Use Topics   • Alcohol use: Not on file   • Drug use: Not on file     Family History   Problem Relation Age of Onset   • Heart disease Biological Mother    • Arrhythmia Biological Mother    • Hypertension Biological Mother        Objective   Vitals:    03/25/21 1654   BP: 122/70   Pulse: 84   Resp: 16   Temp: 36.5 °C (97.7 °F)   SpO2: 97%   Weight: 114 kg (251 lb)   Height: 1.6 m (5' 3\")    Body mass index is 44.46 kg/m².  Physical Exam  Vitals signs reviewed.   Constitutional:       Appearance: Normal appearance.   Neck:      Musculoskeletal: No neck rigidity or muscular tenderness.   Cardiovascular:      Rate and Rhythm: Normal rate and regular rhythm.   Pulmonary:      Effort: Pulmonary effort is normal.      Breath sounds: Normal breath sounds.   Abdominal:      General: Abdomen is flat. There is no distension.      Palpations: There is no mass.   Musculoskeletal:         General: Tenderness (right buttock pain ) present.   Skin:     General: Skin is warm and dry.   Neurological:      General: No focal deficit present.      Mental Status: She is alert.         Assessment/Plan   Problem List Items Addressed This Visit        Nervous    Right groin pain - Primary     See HPI  Check xray- rule out occult fracture's  ( xray was negative)   If negative and not improving will consider MRI  Now suggest ice/advil rest - update Monday            Respiratory    Mild intermittent asthma with acute exacerbation     Stable on current meds             Circulatory    Vasovagal syncope     Probable vasovagal syncope after painful hip injury/  Probable tear of hamstring at " insertion            Endocrine/Metabolic    Obesity, Class III, BMI 40-49.9 (morbid obesity) (CMS/HCC)     Diet and exercise.           BMI 40.0-44.9, adult (CMS/HCC)     Struggles.  Tries to exercise and diet with her             Other    Malignant melanoma of skin (CMS/HCC)     Pt follows with derm regularly               Patient agrees and verbalizes understanding of medication and treatment plan discussed at today's visit.  Patient was instructed to call or follow-up if symptoms persist or worsen.    No notes on file    Brigitte Andrea DO      This note was created with voice recognition software. Inadvertent dictation errors should be disregarded. Please contact my office for clarification.

## 2021-03-26 ENCOUNTER — TELEPHONE (OUTPATIENT)
Dept: FAMILY MEDICINE | Facility: CLINIC | Age: 61
End: 2021-03-26

## 2021-03-26 ASSESSMENT — ENCOUNTER SYMPTOMS
CONSTITUTIONAL NEGATIVE: 1
CARDIOVASCULAR NEGATIVE: 1
RESPIRATORY NEGATIVE: 1

## 2021-03-26 NOTE — TELEPHONE ENCOUNTER
Patient wanted to document that she had the lab during on 2/15/2021 and 3/15/2021.  [employer]  thank you

## 2021-03-27 NOTE — ASSESSMENT & PLAN NOTE
See HPI  Check xray- rule out occult fracture's  ( xray was negative)   If negative and not improving will consider MRI  Now suggest ice/advil rest - update Monday

## 2021-04-02 DIAGNOSIS — E03.9 ACQUIRED HYPOTHYROIDISM: ICD-10-CM

## 2021-04-02 RX ORDER — LEVOTHYROXINE SODIUM 100 UG/1
TABLET ORAL
Qty: 90 TABLET | Refills: 0 | Status: SHIPPED | OUTPATIENT
Start: 2021-04-02 | End: 2021-07-09

## 2021-04-02 NOTE — TELEPHONE ENCOUNTER
Medicine Refill Request    Last Office Visit: 3/25/2021  Last Telemedicine Visit: Visit date not found    Next Office Visit: Visit date not found  Next Telemedicine Visit: Visit date not found         Current Outpatient Medications:   •  ADVAIR -21 mcg/actuation inhaler, INHALE 2 PUFFS BY MOUTH TWICE A DAY IN THE MORNING AND IN THE EVENING, Disp: 12 g, Rfl: 1  •  albuterol 2.5 mg /3 mL (0.083 %) nebulizer solution, Take 3 mL (2.5 mg total) by nebulization every 6 (six) hours as needed for wheezing., Disp: 1 Package, Rfl: 1  •  cholecalciferol, vitamin D3, (VITAMIN D3) 2,000 unit capsule, No SIG Entered, Disp: , Rfl:   •  famotidine (PEPCID) 40 mg tablet, TAKE 1 TABLET BY MOUTH TWICE A DAY, Disp: 180 tablet, Rfl: 1  •  fluticasone propionate (FLONASE) 50 mcg/actuation nasal spray, Administer 2 sprays into each nostril daily. (Patient taking differently: Administer 2 sprays into each nostril daily as needed.  ), Disp: 1 Bottle, Rfl: 5  •  omeprazole (PriLOSEC) 40 mg capsule, TAKE 1 CAPSULE BY MOUTH EVERY DAY BEFORE BREAKFAST, Disp: 30 capsule, Rfl: 1  •  SYNTHROID 100 mcg tablet, TAKE 1 TABLET BY MOUTH ONCE DAILY. BRAND NECESSARY., Disp: 90 tablet, Rfl: 0  •  VENTOLIN HFA 90 mcg/actuation inhaler, TAKE 2 PUFFS BY MOUTH EVERY 6 HOURS AS NEEDED FOR WHEEZE, Disp: 18 Inhaler, Rfl: 1      BP Readings from Last 3 Encounters:   03/25/21 122/70   12/30/20 (!) 160/90   09/18/19 122/84       Recent Lab results:  Lab Results   Component Value Date    CHOL 211 (H) 12/30/2020   ,   Lab Results   Component Value Date    HDL 74 12/30/2020   ,   Lab Results   Component Value Date    LDLCALC 124 (H) 12/30/2020   ,   Lab Results   Component Value Date    TRIG 74 12/30/2020        Lab Results   Component Value Date    GLUCOSE 103 (H) 12/30/2020   ,   Lab Results   Component Value Date    HGBA1C 5.8 (H) 12/30/2020         Lab Results   Component Value Date    CREATININE 0.72 12/30/2020       Lab Results   Component Value Date     TSH 1.180 12/30/2020

## 2021-04-06 ENCOUNTER — TELEPHONE (OUTPATIENT)
Dept: FAMILY MEDICINE | Facility: CLINIC | Age: 61
End: 2021-04-06

## 2021-04-06 NOTE — TELEPHONE ENCOUNTER
Hip mri approved [id number] for [facility] imaging in [place]. [phone]. Pt notified

## 2021-04-08 ENCOUNTER — TELEPHONE (OUTPATIENT)
Dept: FAMILY MEDICINE | Facility: CLINIC | Age: 61
End: 2021-04-08

## 2021-04-08 DIAGNOSIS — Z23 ENCOUNTER FOR IMMUNIZATION: ICD-10-CM

## 2021-04-08 NOTE — TELEPHONE ENCOUNTER
Lt leg is still is bruised but is swelling and painful on the calf & shin. She wants to know if she should come in or have another test or go to urgent care? You don't have any appts available today.  Please call. She has here  MRI 12:15 today at [place].

## 2021-04-08 NOTE — TELEPHONE ENCOUNTER
Patient went to urgent care and had US of her L leg. It was negative for dvt. She is having swelling and pain. She was told by urgent care to use ice and aleve. She wanted to make sure we were in agreement with their recommendations. I recommended ice and elevation of the leg to help with pain and swelling. She is going for her MRI of the R hip today.

## 2021-04-08 NOTE — TELEPHONE ENCOUNTER
Let patient know I got her negative DVT study.  I will try to track down her MRI but find out which [facility] she went to for this study.  Was it an outpatient [facility] site near her in [place] or somewhere down here.  I will try to call her after hours

## 2021-04-09 ENCOUNTER — TELEPHONE (OUTPATIENT)
Dept: FAMILY MEDICINE | Facility: CLINIC | Age: 61
End: 2021-04-09

## 2021-04-09 NOTE — TELEPHONE ENCOUNTER
Spoke to pt. The DVT study was negative of the left leg. This was the side that pt hit when she fell about 2 weeks ago.  She has a lump on the anterior shin.  She also has pain along the edge of the calf which she had bruised initially.  The pain is improving a little where the hamstring inserts.  Await right leg MRI

## 2021-04-09 NOTE — TELEPHONE ENCOUNTER
I left message on patient's voicemail that she had an high-grade tear of the hamstring.  Recommended PRICE.  Protection from a further injury and then rest, ice, compression, elevation, I also suggested she may benefit from some PT and to call for prescription if she would like to do that and an update on Monday..  I will scan in for her

## 2021-04-09 NOTE — TELEPHONE ENCOUNTER
Can you call [facility] outpatient imaging.  Patient said she had her study done in [place].  The number I have listed as [phone].  Can you please see if they have any results from the MRI for this patient.  Done yesterday

## 2021-04-12 ENCOUNTER — TELEPHONE (OUTPATIENT)
Dept: FAMILY MEDICINE | Facility: CLINIC | Age: 61
End: 2021-04-12

## 2021-04-12 DIAGNOSIS — S76.311A TEAR OF RIGHT HAMSTRING: Primary | ICD-10-CM

## 2021-04-12 NOTE — TELEPHONE ENCOUNTER
"Patient states (BB) left her a VM on Friday to call today, with an update on sx's due to her \"high grade hamstring tear\". Patient states she is still having discomfort, especially with sitting. What is next step? Physical therapy? Patient has been following the RICE protocol.  "

## 2021-04-13 PROBLEM — S76.311A TEAR OF RIGHT HAMSTRING: Status: ACTIVE | Noted: 2021-04-13

## 2021-04-13 NOTE — TELEPHONE ENCOUNTER
Spoke with pt and gave referral to PT and also recommended she see ortho.  She will call her orthopedist who she saw before for her knee.  I wll try to scan the MRI report so pt can print to take with her

## 2021-05-05 ENCOUNTER — TELEPHONE (OUTPATIENT)
Dept: FAMILY MEDICINE | Facility: CLINIC | Age: 61
End: 2021-05-05

## 2021-05-05 NOTE — TELEPHONE ENCOUNTER
Called patient to see how she was doing. She reports that she gets better each week. She saw the orthopedist and he recommended physical therapy. The first available appointment is may 11. She will be starting PT then.

## 2021-06-09 ENCOUNTER — TELEMEDICINE (OUTPATIENT)
Dept: FAMILY MEDICINE | Facility: CLINIC | Age: 61
End: 2021-06-09
Payer: COMMERCIAL

## 2021-06-09 DIAGNOSIS — R05.9 COUGH: Primary | ICD-10-CM

## 2021-06-09 DIAGNOSIS — J45.41 MODERATE PERSISTENT ASTHMA WITH ACUTE EXACERBATION: ICD-10-CM

## 2021-06-09 PROCEDURE — 99214 OFFICE O/P EST MOD 30 MIN: CPT | Mod: 95 | Performed by: FAMILY MEDICINE

## 2021-06-09 RX ORDER — DICLOFENAC SODIUM 10 MG/G
2-4 GEL TOPICAL
COMMUNITY
Start: 2021-04-08 | End: 2021-06-09

## 2021-06-09 RX ORDER — MELOXICAM 15 MG/1
1 TABLET ORAL DAILY PRN
COMMUNITY
Start: 2021-04-22 | End: 2021-06-09

## 2021-06-09 RX ORDER — AZITHROMYCIN 250 MG/1
TABLET, FILM COATED ORAL
Qty: 6 TABLET | Refills: 0 | Status: SHIPPED | OUTPATIENT
Start: 2021-06-09 | End: 2021-07-14 | Stop reason: SDUPTHER

## 2021-06-09 RX ORDER — PREDNISONE 10 MG/1
TABLET ORAL
Qty: 20 TABLET | Refills: 0 | Status: SHIPPED | OUTPATIENT
Start: 2021-06-09 | End: 2021-07-09 | Stop reason: SDUPTHER

## 2021-06-09 ASSESSMENT — ENCOUNTER SYMPTOMS
FEVER: 0
SORE THROAT: 0
FATIGUE: 1
PALPITATIONS: 0
SHORTNESS OF BREATH: 1
CHILLS: 0
ACTIVITY CHANGE: 1
CHEST TIGHTNESS: 1
WHEEZING: 1
COUGH: 1

## 2021-06-09 NOTE — PROGRESS NOTES
Verification of Patient Location:  The patient affirms they are currently located in the following state: [place]    Request for Consent:    Audio and Video Encounter   Jv, my name is Audelia Reeves DO.  Before we proceed, can you please verify your identification by telling me your full name and date of birth?  Can you tell me who is in the room with you?    You and I are about to have a telemedicine check-in or visit because you have requested it.  This is a live video-conference.  I am a real person, speaking to you in real time.  There is no one else with me on the video-conference.  However, when we use ([employer], [employer], etc) it is important for you to know that the video-conference may not be secure or private.  I am not recording this conversation and I am asking you not to record it.  This telemedicine visit will be billed to your health insurance or you, if you are self-insured.  You understand you will be responsible for any copayments or coinsurances that apply to your telemedicine visit.  Communication platform used for this encounter:  [employer]ity     Before starting our telemedicine visit, I am required to get your consent for this virtual check-in or visit by telemedicine. Do you consent?      Patient Response to Request for Consent:  Yes      Visit Documentation:  Subjective     Patient ID: Edmundo Dumas is a 61 y.o. female.  1960      Video telemedicine encounter done due to coronavirus pandemic.  Patient has a history of asthma.  She has been stable on her current medicine.  Patient saw her family over Memorial Day weekend including young grandchildren who had a mild cold.  Patient noted some mild increase in cold symptoms but over the last 3 to 4 days she has had a progressively worsening cough hacking so hard at times that she almost throws up.  She feels tight in her chest with wheezing and some shortness of breath with exertion.  She has been using her inhalers but still feels  like she is not improving.  No fever or chills but she does have fatigue.  Patient has history of bronchitis and having infections in her chest.  Patient looks tired and coughs during video encounter.      The following have been reviewed and updated as appropriate in this visit:  Tobacco  Allergies  Meds  Problems  Med Hx  Surg Hx  Fam Hx       Review of Systems   Constitutional: Positive for activity change and fatigue. Negative for chills and fever.   HENT: Positive for congestion. Negative for ear pain and sore throat.    Respiratory: Positive for cough, chest tightness, shortness of breath and wheezing.    Cardiovascular: Negative for chest pain, palpitations and leg swelling.         Assessment/Plan   Diagnoses and all orders for this visit:    Cough (Primary)  -     azithromycin (ZITHROMAX) 250 mg tablet; Take 2 tablets the first day, then 1 tablet daily for 4 days.    Moderate persistent asthma with acute exacerbation  -     predniSONE (DELTASONE) 10 mg tablet; Take 4 tablets daily for 2 days, then 3 tablets daily for 2 days, then 2 tablets daily for 2 days, then 1 tablet daily for 2 days.    Patient with his degree of respiratory infections.  She does have exacerbation of her asthma currently.  Start patient on steroids.  Restart inhaled steroids as she is weans down on oral steroids.  Also start patient on antibiotic.  Continue with albuterol as nebulizer or inhaler.  Fluids rest and OTC medicine if needed.  Patient to follow-up with any worsening symptoms.  Patient had Covid vaccine.    Time Spent:  I spent 8 minutes on this date of service performing the following activities: obtaining history, performing examination, entering orders and documenting.

## 2021-07-08 DIAGNOSIS — E03.9 ACQUIRED HYPOTHYROIDISM: ICD-10-CM

## 2021-07-09 ENCOUNTER — TELEPHONE (OUTPATIENT)
Dept: FAMILY MEDICINE | Facility: CLINIC | Age: 61
End: 2021-07-09

## 2021-07-09 ENCOUNTER — HOSPITAL ENCOUNTER (OUTPATIENT)
Dept: RADIOLOGY | Age: 61
Discharge: HOME | End: 2021-07-09
Attending: FAMILY MEDICINE
Payer: COMMERCIAL

## 2021-07-09 ENCOUNTER — OFFICE VISIT (OUTPATIENT)
Dept: FAMILY MEDICINE | Facility: CLINIC | Age: 61
End: 2021-07-09
Payer: COMMERCIAL

## 2021-07-09 VITALS
HEIGHT: 63 IN | SYSTOLIC BLOOD PRESSURE: 130 MMHG | WEIGHT: 254 LBS | BODY MASS INDEX: 45 KG/M2 | TEMPERATURE: 98.1 F | DIASTOLIC BLOOD PRESSURE: 90 MMHG | HEART RATE: 86 BPM | RESPIRATION RATE: 18 BRPM

## 2021-07-09 DIAGNOSIS — E66.01 OBESITY, CLASS III, BMI 40-49.9 (MORBID OBESITY) (CMS/HCC): ICD-10-CM

## 2021-07-09 DIAGNOSIS — R05.9 COUGH: ICD-10-CM

## 2021-07-09 DIAGNOSIS — J45.21 MILD INTERMITTENT ASTHMA WITH ACUTE EXACERBATION: ICD-10-CM

## 2021-07-09 DIAGNOSIS — R06.2 WHEEZING: Primary | ICD-10-CM

## 2021-07-09 DIAGNOSIS — R06.2 WHEEZING: ICD-10-CM

## 2021-07-09 DIAGNOSIS — J45.41 MODERATE PERSISTENT ASTHMA WITH ACUTE EXACERBATION: ICD-10-CM

## 2021-07-09 DIAGNOSIS — J45.909 UNCOMPLICATED ASTHMA, UNSPECIFIED ASTHMA SEVERITY, UNSPECIFIED WHETHER PERSISTENT: ICD-10-CM

## 2021-07-09 PROCEDURE — 99214 OFFICE O/P EST MOD 30 MIN: CPT | Performed by: FAMILY MEDICINE

## 2021-07-09 PROCEDURE — 3008F BODY MASS INDEX DOCD: CPT | Performed by: FAMILY MEDICINE

## 2021-07-09 PROCEDURE — 71046 X-RAY EXAM CHEST 2 VIEWS: CPT

## 2021-07-09 RX ORDER — LEVOTHYROXINE SODIUM 100 UG/1
TABLET ORAL
Qty: 90 TABLET | Refills: 0 | Status: SHIPPED | OUTPATIENT
Start: 2021-07-09 | End: 2021-09-30

## 2021-07-09 RX ORDER — FLUTICASONE PROPIONATE AND SALMETEROL XINAFOATE 230; 21 UG/1; UG/1
2 AEROSOL, METERED RESPIRATORY (INHALATION)
Qty: 12 G | Refills: 11 | Status: SHIPPED | OUTPATIENT
Start: 2021-07-09 | End: 2022-08-09

## 2021-07-09 RX ORDER — AZITHROMYCIN 250 MG/1
TABLET, FILM COATED ORAL
Qty: 6 TABLET | Refills: 0 | Status: SHIPPED | OUTPATIENT
Start: 2021-07-09 | End: 2021-07-14 | Stop reason: SDUPTHER

## 2021-07-09 RX ORDER — PREDNISONE 10 MG/1
TABLET ORAL
Qty: 20 TABLET | Refills: 0 | Status: SHIPPED | OUTPATIENT
Start: 2021-07-09 | End: 2021-09-22 | Stop reason: SDUPTHER

## 2021-07-09 RX ORDER — ALBUTEROL SULFATE 0.83 MG/ML
2.5 SOLUTION RESPIRATORY (INHALATION) EVERY 6 HOURS PRN
Qty: 75 ML | Refills: 1 | Status: SHIPPED | OUTPATIENT
Start: 2021-07-09 | End: 2022-08-25 | Stop reason: SDUPTHER

## 2021-07-09 RX ORDER — FLUTICASONE PROPIONATE AND SALMETEROL XINAFOATE 230; 21 UG/1; UG/1
2 AEROSOL, METERED RESPIRATORY (INHALATION)
Qty: 12 G | Refills: 11 | Status: SHIPPED | OUTPATIENT
Start: 2021-07-09 | End: 2021-07-09 | Stop reason: SDUPTHER

## 2021-07-09 ASSESSMENT — ENCOUNTER SYMPTOMS
WHEEZING: 1
SINUS PRESSURE: 0
CONSTITUTIONAL NEGATIVE: 1
COUGH: 1
CHOKING: 0
PALPITATIONS: 0
CARDIOVASCULAR NEGATIVE: 1
CHEST TIGHTNESS: 0
SINUS PAIN: 0

## 2021-07-09 NOTE — PROGRESS NOTES
Subjective      Patient ID: Edmundo Dumas is a 61 y.o. female.    Pt is having cough, worsening asthma sx.  No fever. Upper chest congestion.  Denies SOB.  Did miss a few days of the advair she thinks. She had the covid vaccine.  She was around her grandchildren.  Her  had some nasal congestion too.  No fever, has had exacerbations of asthma in past with URI.        Tobacco  Allergies  Meds  Problems  Med Hx  Surg Hx  Fam Hx       Review of Systems   Constitutional: Negative.    HENT: Negative for congestion, sinus pressure and sinus pain.    Respiratory: Positive for cough and wheezing. Negative for choking and chest tightness.    Cardiovascular: Negative.  Negative for chest pain and palpitations.     Allergies   Allergen Reactions   • Contrast  [Iodinated Contrast Media] Nausea And Vomiting and Dermatitis   • Moxifloxacin Nausea And Vomiting and Dermatitis   • Penicillins      Other reaction(s): Hives   • Sulfa (Sulfonamide Antibiotics) Nausea And Vomiting     Current Outpatient Medications   Medication Sig Dispense Refill   • albuterol 2.5 mg /3 mL (0.083 %) nebulizer solution Take 3 mL (2.5 mg total) by nebulization every 6 (six) hours as needed for wheezing. 75 mL 1   • azithromycin (ZITHROMAX) 250 mg tablet Take 2 tablets the first day, then 1 tablet daily for 4 days. 6 tablet 0   • cholecalciferol, vitamin D3, (VITAMIN D3) 2,000 unit capsule No SIG Entered     • famotidine (PEPCID) 40 mg tablet TAKE 1 TABLET BY MOUTH TWICE A  tablet 1   • omeprazole (PriLOSEC) 40 mg capsule TAKE 1 CAPSULE BY MOUTH EVERY DAY BEFORE BREAKFAST 30 capsule 1   • predniSONE (DELTASONE) 10 mg tablet Take 4 tablets daily for 2 days, then 3 tablets daily for 2 days, then 2 tablets daily for 2 days, then 1 tablet daily for 2 days. 20 tablet 0   • SYNTHROID 100 mcg tablet TAKE 1 TABLET BY MOUTH ONCE DAILY 90 tablet 0   • VENTOLIN HFA 90 mcg/actuation inhaler TAKE 2 PUFFS BY MOUTH EVERY 6 HOURS AS NEEDED FOR  "WHEEZE 18 Inhaler 1   • azithromycin (ZITHROMAX) 250 mg tablet Take 2 tablets the first day, then 1 tablet daily for 4 days. 6 tablet 0   • fluticasone propion-salmeteroL (ADVAIR HFA) 230-21 mcg/actuation inhaler Inhale 2 puffs 2 (two) times a day. Rinse mouth with water after use to reduce aftertaste and incidence of candidiasis. Do not swallow. For patients not on a ventilator, a spacer is recommended to be used with this medication/inhaler. 12 g 11   • fluticasone propionate (FLONASE) 50 mcg/actuation nasal spray Administer 2 sprays into each nostril daily. (Patient taking differently: Administer 2 sprays into each nostril daily as needed.  ) 1 Bottle 5     No current facility-administered medications for this visit.     Past Medical History:   Diagnosis Date   • Hx of phlebitis    • Hypothyroid 4/10/2014    Overview:    • Malignant melanoma of skin (CMS/HCC) 4/10/2014    Overview:    • Mild intermittent asthma with acute exacerbation 4/10/2014    Overview:    • Reflux esophagitis 4/10/2014    Overview:      Past Surgical History:   Procedure Laterality Date   • POLYPECTOMY      from stomach   • THYROIDECTOMY       Social History     Tobacco Use   • Smoking status: Never Smoker   • Smokeless tobacco: Never Used   Substance Use Topics   • Alcohol use: Not on file   • Drug use: Not on file     Family History   Problem Relation Age of Onset   • Heart disease Biological Mother    • Arrhythmia Biological Mother    • Hypertension Biological Mother        Objective   Vitals:    07/09/21 1517   BP: 130/90   Pulse: 86   Resp: 18   Temp: 36.7 °C (98.1 °F)   Weight: 115 kg (254 lb)   Height: 1.6 m (5' 3\")    Body mass index is 44.99 kg/m².  Physical Exam  Vitals reviewed.   Constitutional:       Appearance: Normal appearance.   HENT:      Right Ear: Tympanic membrane normal.      Left Ear: Tympanic membrane normal.   Cardiovascular:      Rate and Rhythm: Normal rate and regular rhythm.      Pulses: Normal pulses.      Heart " sounds: Normal heart sounds.   Pulmonary:      Effort: Pulmonary effort is normal.      Breath sounds: Normal breath sounds. No rhonchi.      Comments: 98% pulse ox   Abdominal:      General: Abdomen is flat.   Neurological:      General: No focal deficit present.      Mental Status: She is alert and oriented to person, place, and time.   Psychiatric:         Mood and Affect: Mood normal.         Assessment/Plan   Problem List Items Addressed This Visit        Respiratory    Asthma     See below          Relevant Medications    predniSONE (DELTASONE) 10 mg tablet    fluticasone propion-salmeteroL (ADVAIR HFA) 230-21 mcg/actuation inhaler    Other Relevant Orders    X-RAY CHEST 2 VIEWS    X-RAY CHEST 2 VIEWS (Completed)    COVID-19 [facility] ([facility])    Cough     See below  CXR  - NAD found         Relevant Orders    X-RAY CHEST 2 VIEWS    X-RAY CHEST 2 VIEWS (Completed)    COVID-19 [facility] ([facility])    Wheezing - Primary     Asthma exacerbation   Possible bronchitis  Use albuterol 3 x day  advair 115/21   Pt on 2 puffs BID .  ( suggest increasing to 230/21 )  Prednisone wean  z pack   vicks  mucinex or robitussin         Relevant Orders    X-RAY CHEST 2 VIEWS    X-RAY CHEST 2 VIEWS (Completed)    COVID-19 [facility] ([facility])       Endocrine/Metabolic    Obesity, Class III, BMI 40-49.9 (morbid obesity) (CMS/[facility], [facility])          Patient agrees and verbalizes understanding of medication and treatment plan discussed at today's visit.  Patient was instructed to call or follow-up if symptoms persist or worsen.    No notes on file    Brigitte Andrea DO      This note was created with voice recognition software. Inadvertent dictation errors should be disregarded. Please contact my office for clarification.

## 2021-07-09 NOTE — TELEPHONE ENCOUNTER
Medicine Refill Request    Last Office Visit: 3/25/2021  Last Telemedicine Visit: 6/9/2021 Audelia Reeves,     Next Office Visit: Visit date not found  Next Telemedicine Visit: Visit date not found         Current Outpatient Medications:   •  ADVAIR -21 mcg/actuation inhaler, INHALE 2 PUFFS BY MOUTH TWICE A DAY IN THE MORNING AND IN THE EVENING, Disp: 12 g, Rfl: 1  •  albuterol 2.5 mg /3 mL (0.083 %) nebulizer solution, Take 3 mL (2.5 mg total) by nebulization every 6 (six) hours as needed for wheezing., Disp: 1 Package, Rfl: 1  •  azithromycin (ZITHROMAX) 250 mg tablet, Take 2 tablets the first day, then 1 tablet daily for 4 days., Disp: 6 tablet, Rfl: 0  •  cholecalciferol, vitamin D3, (VITAMIN D3) 2,000 unit capsule, No SIG Entered, Disp: , Rfl:   •  famotidine (PEPCID) 40 mg tablet, TAKE 1 TABLET BY MOUTH TWICE A DAY, Disp: 180 tablet, Rfl: 1  •  fluticasone propionate (FLONASE) 50 mcg/actuation nasal spray, Administer 2 sprays into each nostril daily. (Patient taking differently: Administer 2 sprays into each nostril daily as needed.  ), Disp: 1 Bottle, Rfl: 5  •  omeprazole (PriLOSEC) 40 mg capsule, TAKE 1 CAPSULE BY MOUTH EVERY DAY BEFORE BREAKFAST, Disp: 30 capsule, Rfl: 1  •  predniSONE (DELTASONE) 10 mg tablet, Take 4 tablets daily for 2 days, then 3 tablets daily for 2 days, then 2 tablets daily for 2 days, then 1 tablet daily for 2 days., Disp: 20 tablet, Rfl: 0  •  SYNTHROID 100 mcg tablet, TAKE 1 TABLET BY MOUTH ONCE DAILY, Disp: 90 tablet, Rfl: 0  •  VENTOLIN HFA 90 mcg/actuation inhaler, TAKE 2 PUFFS BY MOUTH EVERY 6 HOURS AS NEEDED FOR WHEEZE, Disp: 18 Inhaler, Rfl: 1      BP Readings from Last 3 Encounters:   03/25/21 122/70   12/30/20 (!) 160/90   09/18/19 122/84       Recent Lab results:  Lab Results   Component Value Date    CHOL 211 (H) 12/30/2020   ,   Lab Results   Component Value Date    HDL 74 12/30/2020   ,   Lab Results   Component Value Date    LDLCALC 124 (H) 12/30/2020   ,    Lab Results   Component Value Date    TRIG 74 12/30/2020        Lab Results   Component Value Date    GLUCOSE 103 (H) 12/30/2020   ,   Lab Results   Component Value Date    HGBA1C 5.8 (H) 12/30/2020         Lab Results   Component Value Date    CREATININE 0.72 12/30/2020       Lab Results   Component Value Date    TSH 1.180 12/30/2020

## 2021-07-09 NOTE — PATIENT INSTRUCTIONS
vicks  Hot shower 2 x day     Steroid  with food  Have xray of chest   advair 2 x day  ( will try to increase from the 115/21/ to the 230/21)  Neb 3 x day as needed   Reasons to go to the ER reviewed

## 2021-07-09 NOTE — TELEPHONE ENCOUNTER
Medicine Refill Request    Last Office Visit: 3/25/2021  Last Telemedicine Visit: 6/9/2021 Audelia Reeves,     Next Office Visit: Visit date not found  Next Telemedicine Visit: Visit date not found         Current Outpatient Medications:   •  ADVAIR -21 mcg/actuation inhaler, INHALE 2 PUFFS BY MOUTH TWICE A DAY IN THE MORNING AND IN THE EVENING, Disp: 12 g, Rfl: 1  •  albuterol 2.5 mg /3 mL (0.083 %) nebulizer solution, Take 3 mL (2.5 mg total) by nebulization every 6 (six) hours as needed for wheezing., Disp: 1 Package, Rfl: 1  •  azithromycin (ZITHROMAX) 250 mg tablet, Take 2 tablets the first day, then 1 tablet daily for 4 days., Disp: 6 tablet, Rfl: 0  •  cholecalciferol, vitamin D3, (VITAMIN D3) 2,000 unit capsule, No SIG Entered, Disp: , Rfl:   •  famotidine (PEPCID) 40 mg tablet, TAKE 1 TABLET BY MOUTH TWICE A DAY, Disp: 180 tablet, Rfl: 1  •  fluticasone propionate (FLONASE) 50 mcg/actuation nasal spray, Administer 2 sprays into each nostril daily. (Patient taking differently: Administer 2 sprays into each nostril daily as needed.  ), Disp: 1 Bottle, Rfl: 5  •  omeprazole (PriLOSEC) 40 mg capsule, TAKE 1 CAPSULE BY MOUTH EVERY DAY BEFORE BREAKFAST, Disp: 30 capsule, Rfl: 1  •  predniSONE (DELTASONE) 10 mg tablet, Take 4 tablets daily for 2 days, then 3 tablets daily for 2 days, then 2 tablets daily for 2 days, then 1 tablet daily for 2 days., Disp: 20 tablet, Rfl: 0  •  SYNTHROID 100 mcg tablet, TAKE 1 TABLET BY MOUTH ONCE DAILY. BRAND NECESSARY., Disp: 90 tablet, Rfl: 0  •  VENTOLIN HFA 90 mcg/actuation inhaler, TAKE 2 PUFFS BY MOUTH EVERY 6 HOURS AS NEEDED FOR WHEEZE, Disp: 18 Inhaler, Rfl: 1      BP Readings from Last 3 Encounters:   03/25/21 122/70   12/30/20 (!) 160/90   09/18/19 122/84       Recent Lab results:  Lab Results   Component Value Date    CHOL 211 (H) 12/30/2020   ,   Lab Results   Component Value Date    HDL 74 12/30/2020   ,   Lab Results   Component Value Date    LDLCALC 124  (H) 12/30/2020   ,   Lab Results   Component Value Date    TRIG 74 12/30/2020        Lab Results   Component Value Date    GLUCOSE 103 (H) 12/30/2020   ,   Lab Results   Component Value Date    HGBA1C 5.8 (H) 12/30/2020         Lab Results   Component Value Date    CREATININE 0.72 12/30/2020       Lab Results   Component Value Date    TSH 1.180 12/30/2020

## 2021-07-09 NOTE — TELEPHONE ENCOUNTER
Spoke with patient. Advised her to do neb first to open airway and then administer advair inhaler. Scheduled same day for today.

## 2021-07-09 NOTE — TELEPHONE ENCOUNTER
Patient has been wheezing for past 2 weeks. Her neb medication is  and a refill request was submitted.Advised patient to schedule an appointment. Patient is asking if she can do both albuterol neb and her Advair inhaler.

## 2021-07-10 LAB — SARS-COV-2 RNA RESP QL NAA+PROBE: NOT DETECTED

## 2021-07-10 NOTE — ASSESSMENT & PLAN NOTE
Asthma exacerbation   Possible bronchitis  Use albuterol 3 x day  advair 115/21   Pt on 2 puffs BID .  ( suggest increasing to 230/21 )  Prednisone wean  z pack   vicks  mucinex or robitussin

## 2021-07-13 ENCOUNTER — TELEPHONE (OUTPATIENT)
Dept: FAMILY MEDICINE | Facility: CLINIC | Age: 61
End: 2021-07-13

## 2021-07-13 NOTE — TELEPHONE ENCOUNTER
Advair -21 mcg inhaler is not covered under insurance.  Alternative requested: Breo Ellipta 100-25mcg OR dulera 50-5mcg.

## 2021-07-14 NOTE — TELEPHONE ENCOUNTER
I sent something to [facility] regarding this.  Pt has failed Dulera and the powder diskus delivery system like Breo have not been tolerated.  Advair HFA form is what she has tolerated in past and got approved last time for a year.  I want to change to the higher dose. If there is a paper please let Pearl know

## 2021-07-15 ENCOUNTER — TELEPHONE (OUTPATIENT)
Dept: FAMILY MEDICINE | Facility: CLINIC | Age: 61
End: 2021-07-15

## 2021-09-03 NOTE — TELEPHONE ENCOUNTER
Called [facility] regarding obtaining a tier exception. They are not able to do this as she has a commercial plan and not medicare. Medication is $500 due to her not having met her deductible. Patient's deductible is $3800

## 2021-09-03 NOTE — TELEPHONE ENCOUNTER
Thank you for notifying pt . I believe she may have a few on hand that she can try to use 1 per day and increase to 2 if gets a flare.

## 2021-09-30 DIAGNOSIS — E03.9 ACQUIRED HYPOTHYROIDISM: ICD-10-CM

## 2021-09-30 RX ORDER — LEVOTHYROXINE SODIUM 100 UG/1
TABLET ORAL
Qty: 90 TABLET | Refills: 0 | Status: SHIPPED | OUTPATIENT
Start: 2021-09-30 | End: 2021-12-22

## 2021-09-30 NOTE — TELEPHONE ENCOUNTER
Medicine Refill Request    Last Office Visit: 7/9/2021  Last Telemedicine Visit: 6/9/2021 Audelia eReves,     Next Office Visit: Visit date not found  Next Telemedicine Visit: Visit date not found         Current Outpatient Medications:   •  albuterol 2.5 mg /3 mL (0.083 %) nebulizer solution, Take 3 mL (2.5 mg total) by nebulization every 6 (six) hours as needed for wheezing., Disp: 75 mL, Rfl: 1  •  albuterol HFA (VENTOLIN HFA) 90 mcg/actuation inhaler, Inhale 2 puffs every 4 (four) hours as needed for wheezing., Disp: 1 each, Rfl: 1  •  cholecalciferol, vitamin D3, (VITAMIN D3) 2,000 unit capsule, No SIG Entered, Disp: , Rfl:   •  famotidine (PEPCID) 40 mg tablet, TAKE 1 TABLET BY MOUTH TWICE A DAY, Disp: 180 tablet, Rfl: 1  •  fluticasone propion-salmeteroL (ADVAIR HFA) 230-21 mcg/actuation inhaler, Inhale 2 puffs 2 (two) times a day. Rinse mouth with water after use to reduce aftertaste and incidence of candidiasis. Do not swallow. For patients not on a ventilator, a spacer is recommended to be used with this medication/inhaler., Disp: 12 g, Rfl: 11  •  fluticasone propionate (FLONASE) 50 mcg/actuation nasal spray, Administer 2 sprays into each nostril daily. (Patient taking differently: Administer 2 sprays into each nostril daily as needed.  ), Disp: 1 Bottle, Rfl: 5  •  levalbuterol (XOPENEX) 0.63 mg/3 mL nebulizer solution, Take 1 ampule by nebulization 3 (three) times a day., Disp: 72 mL, Rfl: 11  •  omeprazole (PriLOSEC) 40 mg capsule, TAKE 1 CAPSULE BY MOUTH EVERY DAY BEFORE BREAKFAST, Disp: 30 capsule, Rfl: 1  •  predniSONE (DELTASONE) 10 mg tablet, Take 4 tablets daily for 2 days, then 3 tablets daily for 2 days, then 2 tablets daily for 2 days, then 1 tablet daily for 2 days., Disp: 20 tablet, Rfl: 0  •  SYNTHROID 100 mcg tablet, TAKE 1 TABLET BY MOUTH ONCE DAILY, Disp: 90 tablet, Rfl: 0      BP Readings from Last 3 Encounters:   07/09/21 130/90   03/25/21 122/70   12/30/20 (!) 160/90        Recent Lab results:  Lab Results   Component Value Date    CHOL 211 (H) 12/30/2020   ,   Lab Results   Component Value Date    HDL 74 12/30/2020   ,   Lab Results   Component Value Date    LDLCALC 124 (H) 12/30/2020   ,   Lab Results   Component Value Date    TRIG 74 12/30/2020        Lab Results   Component Value Date    GLUCOSE 103 (H) 12/30/2020   ,   Lab Results   Component Value Date    HGBA1C 5.8 (H) 12/30/2020         Lab Results   Component Value Date    CREATININE 0.72 12/30/2020       Lab Results   Component Value Date    TSH 1.180 12/30/2020

## 2021-10-14 ENCOUNTER — OFFICE VISIT (OUTPATIENT)
Dept: FAMILY MEDICINE | Facility: CLINIC | Age: 61
End: 2021-10-14
Payer: COMMERCIAL

## 2021-10-14 VITALS
WEIGHT: 251.2 LBS | BODY MASS INDEX: 44.51 KG/M2 | SYSTOLIC BLOOD PRESSURE: 120 MMHG | TEMPERATURE: 97.8 F | HEIGHT: 63 IN | DIASTOLIC BLOOD PRESSURE: 70 MMHG | HEART RATE: 90 BPM | RESPIRATION RATE: 18 BRPM

## 2021-10-14 DIAGNOSIS — K57.92 DIVERTICULITIS: Primary | ICD-10-CM

## 2021-10-14 PROBLEM — R53.1 DECREASED STRENGTH: Status: ACTIVE | Noted: 2021-05-11

## 2021-10-14 PROBLEM — M79.604 LEG PAIN, RIGHT: Status: ACTIVE | Noted: 2021-05-11

## 2021-10-14 PROCEDURE — 3008F BODY MASS INDEX DOCD: CPT | Performed by: FAMILY MEDICINE

## 2021-10-14 PROCEDURE — 99214 OFFICE O/P EST MOD 30 MIN: CPT | Performed by: FAMILY MEDICINE

## 2021-10-14 RX ORDER — CIPROFLOXACIN 500 MG/1
500 TABLET ORAL 2 TIMES DAILY
Qty: 14 TABLET | Refills: 0 | Status: SHIPPED | OUTPATIENT
Start: 2021-10-14 | End: 2021-10-21

## 2021-10-14 RX ORDER — METRONIDAZOLE 500 MG/1
500 TABLET ORAL 3 TIMES DAILY
Qty: 30 TABLET | Refills: 0 | Status: SHIPPED | OUTPATIENT
Start: 2021-10-14 | End: 2021-10-24

## 2021-10-14 ASSESSMENT — ENCOUNTER SYMPTOMS
DIARRHEA: 0
CHILLS: 1
CONSTIPATION: 0
PALPITATIONS: 0
SHORTNESS OF BREATH: 0
FATIGUE: 0
BLOOD IN STOOL: 0
ABDOMINAL PAIN: 1
NAUSEA: 0
WHEEZING: 0
FEVER: 0
DIAPHORESIS: 0
NEUROLOGICAL NEGATIVE: 1
STRIDOR: 0
COUGH: 0

## 2021-10-14 NOTE — PROGRESS NOTES
Subjective      Patient ID: Edmundo Dumas is a 61 y.o. female.    Pt traveled to [place].  She had sone LLQ when she was away. She did have some popcorn the night before when she had some LLQ shooting pain then got the pain in the left lower quad pain .  She went on liquid diet yesterday and feels better            Review of Systems   Constitutional: Positive for chills. Negative for diaphoresis, fatigue and fever.   HENT: Negative.    Respiratory: Negative for cough, shortness of breath, wheezing and stridor.    Cardiovascular: Negative for chest pain, palpitations and leg swelling.   Gastrointestinal: Positive for abdominal pain. Negative for blood in stool, constipation, diarrhea and nausea.   Genitourinary: Negative.    Neurological: Negative.      Allergies   Allergen Reactions   • Contrast  [Iodinated Contrast Media] Nausea And Vomiting and Dermatitis   • Montelukast      Vision change   • Moxifloxacin Nausea And Vomiting and Dermatitis   • Penicillins      Other reaction(s): Hives   • Sulfa (Sulfonamide Antibiotics) Nausea And Vomiting     Current Outpatient Medications   Medication Sig Dispense Refill   • albuterol HFA (VENTOLIN HFA) 90 mcg/actuation inhaler Inhale 2 puffs every 4 (four) hours as needed for wheezing. 1 each 1   • cholecalciferol, vitamin D3, (VITAMIN D3) 2,000 unit capsule No SIG Entered     • famotidine (PEPCID) 40 mg tablet TAKE 1 TABLET BY MOUTH TWICE A  tablet 1   • fluticasone propion-salmeteroL (ADVAIR HFA) 230-21 mcg/actuation inhaler Inhale 2 puffs 2 (two) times a day. Rinse mouth with water after use to reduce aftertaste and incidence of candidiasis. Do not swallow. For patients not on a ventilator, a spacer is recommended to be used with this medication/inhaler. 12 g 11   • levalbuterol (XOPENEX) 0.63 mg/3 mL nebulizer solution Take 1 ampule by nebulization 3 (three) times a day. 72 mL 11   • omeprazole (PriLOSEC) 40 mg capsule TAKE 1 CAPSULE BY MOUTH EVERY DAY  "BEFORE BREAKFAST 30 capsule 1   • predniSONE (DELTASONE) 10 mg tablet Take 4 tablets daily for 2 days, then 3 tablets daily for 2 days, then 2 tablets daily for 2 days, then 1 tablet daily for 2 days. 20 tablet 0   • SYNTHROID 100 mcg tablet TAKE 1 TABLET BY MOUTH EVERY DAY 90 tablet 0   • albuterol 2.5 mg /3 mL (0.083 %) nebulizer solution Take 3 mL (2.5 mg total) by nebulization every 6 (six) hours as needed for wheezing. 75 mL 1   • fluticasone propionate (FLONASE) 50 mcg/actuation nasal spray Administer 2 sprays into each nostril daily. (Patient taking differently: Administer 2 sprays into each nostril daily as needed.  ) 1 Bottle 5   • metroNIDAZOLE 500 mg tablet Take 1 tablet (500 mg total) by mouth 3 (three) times a day for 10 days. 30 tablet 0     No current facility-administered medications for this visit.     Past Medical History:   Diagnosis Date   • Hx of phlebitis    • Hypothyroid 4/10/2014    Overview:    • Malignant melanoma of skin (CMS/HCC) 4/10/2014    Overview:    • Mild intermittent asthma with acute exacerbation 4/10/2014    Overview:    • Reflux esophagitis 4/10/2014    Overview:      Past Surgical History:   Procedure Laterality Date   • POLYPECTOMY      from stomach   • THYROIDECTOMY       Social History     Tobacco Use   • Smoking status: Never Smoker   • Smokeless tobacco: Never Used   Substance Use Topics   • Alcohol use: Not on file   • Drug use: Not on file     Family History   Problem Relation Age of Onset   • Heart disease Biological Mother    • Arrhythmia Biological Mother    • Hypertension Biological Mother        Objective   Vitals:    10/14/21 1310   BP: 120/70   Pulse: 90   Resp: 18   Temp: 36.6 °C (97.8 °F)   Weight: 114 kg (251 lb 3.2 oz)   Height: 1.6 m (5' 3\")    Body mass index is 44.5 kg/m².  Physical Exam  Vitals reviewed.         Assessment/Plan   Problem List Items Addressed This Visit     None      Visit Diagnoses     Diverticulitis    -  Primary    Relevant Orders    " CBC    Comprehensive metabolic panel          Patient agrees and verbalizes understanding of medication and treatment plan discussed at today's visit.  Patient was instructed to call or follow-up if symptoms persist or worsen.    No notes on file    Brigitte Andrea,       This note was created with voice recognition software. Inadvertent dictation errors should be disregarded. Please contact my office for clarification.

## 2021-10-15 LAB
ALBUMIN SERPL-MCNC: 4.4 G/DL (ref 3.8–4.8)
ALBUMIN/GLOB SERPL: 1.7 {RATIO} (ref 1.2–2.2)
ALP SERPL-CCNC: 69 IU/L (ref 44–121)
ALT SERPL-CCNC: 16 IU/L (ref 0–32)
AST SERPL-CCNC: 11 IU/L (ref 0–40)
BILIRUB SERPL-MCNC: 0.7 MG/DL (ref 0–1.2)
BUN SERPL-MCNC: 11 MG/DL (ref 8–27)
BUN/CREAT SERPL: 14 (ref 12–28)
CALCIUM SERPL-MCNC: 9.7 MG/DL (ref 8.7–10.3)
CHLORIDE SERPL-SCNC: 102 MMOL/L (ref 96–106)
CO2 SERPL-SCNC: 24 MMOL/L (ref 20–29)
CREAT SERPL-MCNC: 0.77 MG/DL (ref 0.57–1)
ERYTHROCYTE [DISTWIDTH] IN BLOOD BY AUTOMATED COUNT: 12.6 % (ref 11.7–15.4)
GLOBULIN SER CALC-MCNC: 2.6 G/DL (ref 1.5–4.5)
GLUCOSE SERPL-MCNC: 101 MG/DL (ref 65–99)
HCT VFR BLD AUTO: 40.7 % (ref 34–46.6)
HGB BLD-MCNC: 13.9 G/DL (ref 11.1–15.9)
LAB CORP EGFR IF AFRICN AM: 95 ML/MIN/1.73
LAB CORP EGFR IF NONAFRICN AM: 82 ML/MIN/1.73
MCH RBC QN AUTO: 30.4 PG (ref 26.6–33)
MCHC RBC AUTO-ENTMCNC: 34.2 G/DL (ref 31.5–35.7)
MCV RBC AUTO: 89 FL (ref 79–97)
PLATELET # BLD AUTO: 226 X10E3/UL (ref 150–450)
POTASSIUM SERPL-SCNC: 4.3 MMOL/L (ref 3.5–5.2)
PROT SERPL-MCNC: 7 G/DL (ref 6–8.5)
RBC # BLD AUTO: 4.57 X10E6/UL (ref 3.77–5.28)
SODIUM SERPL-SCNC: 141 MMOL/L (ref 134–144)
WBC # BLD AUTO: 8 X10E3/UL (ref 3.4–10.8)

## 2021-12-14 NOTE — RESULT ENCOUNTER NOTE
Edmundo    Copy of biopsy results. Referred per notes to see surgeon for consult in Jan. Any questions let me know

## 2021-12-22 DIAGNOSIS — E03.9 ACQUIRED HYPOTHYROIDISM: ICD-10-CM

## 2021-12-22 RX ORDER — LEVOTHYROXINE SODIUM 100 UG/1
TABLET ORAL
Qty: 90 TABLET | Refills: 0 | Status: SHIPPED | OUTPATIENT
Start: 2021-12-22 | End: 2022-03-11

## 2021-12-22 NOTE — TELEPHONE ENCOUNTER
Medicine Refill Request    Last Office: 10/14/2021   Last Consult Visit: Visit date not found  Last Telemedicine Visit: 6/9/2021 Audelia Reeves, DO    Next Appointment: Visit date not found      Current Outpatient Medications:   •  albuterol 2.5 mg /3 mL (0.083 %) nebulizer solution, Take 3 mL (2.5 mg total) by nebulization every 6 (six) hours as needed for wheezing., Disp: 75 mL, Rfl: 1  •  albuterol HFA (VENTOLIN HFA) 90 mcg/actuation inhaler, Inhale 2 puffs every 4 (four) hours as needed for wheezing., Disp: 1 each, Rfl: 1  •  cholecalciferol, vitamin D3, (VITAMIN D3) 2,000 unit capsule, No SIG Entered, Disp: , Rfl:   •  famotidine (PEPCID) 40 mg tablet, TAKE 1 TABLET BY MOUTH TWICE A DAY, Disp: 180 tablet, Rfl: 1  •  fluticasone propion-salmeteroL (ADVAIR HFA) 230-21 mcg/actuation inhaler, Inhale 2 puffs 2 (two) times a day. Rinse mouth with water after use to reduce aftertaste and incidence of candidiasis. Do not swallow. For patients not on a ventilator, a spacer is recommended to be used with this medication/inhaler., Disp: 12 g, Rfl: 11  •  fluticasone propionate (FLONASE) 50 mcg/actuation nasal spray, Administer 2 sprays into each nostril daily. (Patient taking differently: Administer 2 sprays into each nostril daily as needed.  ), Disp: 1 Bottle, Rfl: 5  •  levalbuterol (XOPENEX) 0.63 mg/3 mL nebulizer solution, Take 1 ampule by nebulization 3 (three) times a day., Disp: 72 mL, Rfl: 11  •  omeprazole (PriLOSEC) 40 mg capsule, TAKE 1 CAPSULE BY MOUTH EVERY DAY BEFORE BREAKFAST, Disp: 30 capsule, Rfl: 1  •  predniSONE (DELTASONE) 10 mg tablet, Take 4 tablets daily for 2 days, then 3 tablets daily for 2 days, then 2 tablets daily for 2 days, then 1 tablet daily for 2 days., Disp: 20 tablet, Rfl: 0  •  SYNTHROID 100 mcg tablet, TAKE 1 TABLET BY MOUTH EVERY DAY, Disp: 90 tablet, Rfl: 0      BP Readings from Last 3 Encounters:   10/14/21 120/70   07/09/21 130/90   03/25/21 122/70       Recent Lab  results:  Lab Results   Component Value Date    CHOL 211 (H) 12/30/2020   ,   Lab Results   Component Value Date    HDL 74 12/30/2020   ,   Lab Results   Component Value Date    LDLCALC 124 (H) 12/30/2020   ,   Lab Results   Component Value Date    TRIG 74 12/30/2020        Lab Results   Component Value Date    GLUCOSE 101 (H) 10/14/2021   ,   Lab Results   Component Value Date    HGBA1C 5.8 (H) 12/30/2020         Lab Results   Component Value Date    CREATININE 0.77 10/14/2021       Lab Results   Component Value Date    TSH 1.180 12/30/2020

## 2022-03-10 DIAGNOSIS — E03.9 ACQUIRED HYPOTHYROIDISM: ICD-10-CM

## 2022-03-11 RX ORDER — LEVOTHYROXINE SODIUM 100 UG/1
TABLET ORAL
Qty: 90 TABLET | Refills: 0 | Status: SHIPPED | OUTPATIENT
Start: 2022-03-11 | End: 2022-06-06

## 2022-06-06 DIAGNOSIS — E03.9 ACQUIRED HYPOTHYROIDISM: ICD-10-CM

## 2022-06-06 RX ORDER — LEVOTHYROXINE SODIUM 100 UG/1
TABLET ORAL
Qty: 90 TABLET | Refills: 0 | Status: SHIPPED | OUTPATIENT
Start: 2022-06-06 | End: 2022-08-19

## 2022-06-06 NOTE — TELEPHONE ENCOUNTER
Medicine Refill Request    Last Office: 10/14/2021   Last Consult Visit: Visit date not found  Last Telemedicine Visit: 6/9/2021 Audelia Reeves, DO    Next Appointment: Visit date not found      Current Outpatient Medications:   •  SYNTHROID 100 mcg tablet, TAKE 1 TABLET BY MOUTH EVERY DAY, Disp: 90 tablet, Rfl: 0  •  albuterol 2.5 mg /3 mL (0.083 %) nebulizer solution, Take 3 mL (2.5 mg total) by nebulization every 6 (six) hours as needed for wheezing., Disp: 75 mL, Rfl: 1  •  albuterol HFA (VENTOLIN HFA) 90 mcg/actuation inhaler, Inhale 2 puffs every 4 (four) hours as needed for wheezing., Disp: 1 each, Rfl: 1  •  cholecalciferol, vitamin D3, (VITAMIN D3) 2,000 unit capsule, No SIG Entered, Disp: , Rfl:   •  famotidine (PEPCID) 40 mg tablet, TAKE 1 TABLET BY MOUTH TWICE A DAY, Disp: 180 tablet, Rfl: 1  •  fluticasone propion-salmeteroL (ADVAIR HFA) 230-21 mcg/actuation inhaler, Inhale 2 puffs 2 (two) times a day. Rinse mouth with water after use to reduce aftertaste and incidence of candidiasis. Do not swallow. For patients not on a ventilator, a spacer is recommended to be used with this medication/inhaler., Disp: 12 g, Rfl: 11  •  fluticasone propionate (FLONASE) 50 mcg/actuation nasal spray, Administer 2 sprays into each nostril daily. (Patient taking differently: Administer 2 sprays into each nostril daily as needed.  ), Disp: 1 Bottle, Rfl: 5  •  levalbuterol (XOPENEX) 0.63 mg/3 mL nebulizer solution, Take 1 ampule by nebulization 3 (three) times a day., Disp: 72 mL, Rfl: 11  •  omeprazole (PriLOSEC) 40 mg capsule, TAKE 1 CAPSULE BY MOUTH EVERY DAY BEFORE BREAKFAST, Disp: 30 capsule, Rfl: 1  •  predniSONE (DELTASONE) 10 mg tablet, Take 4 tablets daily for 2 days, then 3 tablets daily for 2 days, then 2 tablets daily for 2 days, then 1 tablet daily for 2 days., Disp: 20 tablet, Rfl: 0      BP Readings from Last 3 Encounters:   10/14/21 120/70   07/09/21 130/90   03/25/21 122/70       Recent Lab  results:  Lab Results   Component Value Date    CHOL 211 (H) 12/30/2020   ,   Lab Results   Component Value Date    HDL 74 12/30/2020   ,   Lab Results   Component Value Date    LDLCALC 124 (H) 12/30/2020   ,   Lab Results   Component Value Date    TRIG 74 12/30/2020        Lab Results   Component Value Date    GLUCOSE 101 (H) 10/14/2021   ,   Lab Results   Component Value Date    HGBA1C 5.8 (H) 12/30/2020         Lab Results   Component Value Date    CREATININE 0.77 10/14/2021       Lab Results   Component Value Date    TSH 1.180 12/30/2020

## 2022-08-04 NOTE — TELEPHONE ENCOUNTER
Pt lm - still tired and has tickle and coughing up a little white substance   Taltz Counseling: I discussed with the patient the risks of ixekizumab including but not limited to immunosuppression, serious infections, worsening of inflammatory bowel disease and drug reactions.  The patient understands that monitoring is required including a PPD at baseline and must alert us or the primary physician if symptoms of infection or other concerning signs are noted.

## 2022-08-09 RX ORDER — FLUTICASONE PROPIONATE AND SALMETEROL XINAFOATE 230; 21 UG/1; UG/1
2 AEROSOL, METERED RESPIRATORY (INHALATION)
Qty: 12 G | Refills: 11 | Status: SHIPPED | OUTPATIENT
Start: 2022-08-09 | End: 2023-08-16

## 2022-08-09 NOTE — TELEPHONE ENCOUNTER
Fax received from pharmacy. Advair HFA  Not on formulary. Need prior auth or changed to [employer].     If doing a PA call: [phone] ID: [id number]

## 2022-08-09 NOTE — TELEPHONE ENCOUNTER
Medicine Refill Request    Last Office: 10/14/2021   Last Consult Visit: Visit date not found  Last Telemedicine Visit: 6/9/2021 Audelia Reeves, DO    Next Appointment: 12/6/2022      Current Outpatient Medications:   •  albuterol 2.5 mg /3 mL (0.083 %) nebulizer solution, Take 3 mL (2.5 mg total) by nebulization every 6 (six) hours as needed for wheezing., Disp: 75 mL, Rfl: 1  •  albuterol HFA (VENTOLIN HFA) 90 mcg/actuation inhaler, Inhale 2 puffs every 4 (four) hours as needed for wheezing., Disp: 1 each, Rfl: 1  •  cholecalciferol, vitamin D3, (VITAMIN D3) 2,000 unit capsule, No SIG Entered, Disp: , Rfl:   •  famotidine (PEPCID) 40 mg tablet, TAKE 1 TABLET BY MOUTH TWICE A DAY, Disp: 180 tablet, Rfl: 1  •  fluticasone propion-salmeteroL (ADVAIR HFA) 230-21 mcg/actuation inhaler, Inhale 2 puffs 2 (two) times a day. Rinse mouth with water after use to reduce aftertaste and incidence of candidiasis. Do not swallow. For patients not on a ventilator, a spacer is recommended to be used with this medication/inhaler., Disp: 12 g, Rfl: 11  •  fluticasone propionate (FLONASE) 50 mcg/actuation nasal spray, Administer 2 sprays into each nostril daily. (Patient taking differently: Administer 2 sprays into each nostril daily as needed.  ), Disp: 1 Bottle, Rfl: 5  •  levalbuterol (XOPENEX) 0.63 mg/3 mL nebulizer solution, Take 1 ampule by nebulization 3 (three) times a day., Disp: 72 mL, Rfl: 11  •  omeprazole (PriLOSEC) 40 mg capsule, TAKE 1 CAPSULE BY MOUTH EVERY DAY BEFORE BREAKFAST, Disp: 30 capsule, Rfl: 1  •  predniSONE (DELTASONE) 10 mg tablet, Take 4 tablets daily for 2 days, then 3 tablets daily for 2 days, then 2 tablets daily for 2 days, then 1 tablet daily for 2 days., Disp: 20 tablet, Rfl: 0  •  SYNTHROID 100 mcg tablet, TAKE 1 TABLET BY MOUTH EVERY DAY, Disp: 90 tablet, Rfl: 0      BP Readings from Last 3 Encounters:   10/14/21 120/70   07/09/21 130/90   03/25/21 122/70       Recent Lab results:  Lab Results    Component Value Date    CHOL 211 (H) 12/30/2020   ,   Lab Results   Component Value Date    HDL 74 12/30/2020   ,   Lab Results   Component Value Date    LDLCALC 124 (H) 12/30/2020   ,   Lab Results   Component Value Date    TRIG 74 12/30/2020        Lab Results   Component Value Date    GLUCOSE 101 (H) 10/14/2021   ,   Lab Results   Component Value Date    HGBA1C 5.8 (H) 12/30/2020         Lab Results   Component Value Date    CREATININE 0.77 10/14/2021       Lab Results   Component Value Date    TSH 1.180 12/30/2020

## 2022-08-11 ENCOUNTER — TELEPHONE (OUTPATIENT)
Dept: FAMILY MEDICINE | Facility: CLINIC | Age: 62
End: 2022-08-11
Payer: COMMERCIAL

## 2022-08-17 NOTE — TELEPHONE ENCOUNTER
Can you all and see if you can let them know that pt has not been able to tolerate any of the powders due to severe coughing and dysphonia with the powders  so she has had to use inhalers only an.   We have tried and failed Symbicort and Dulera in past.

## 2022-08-19 ENCOUNTER — TELEMEDICINE (OUTPATIENT)
Dept: FAMILY MEDICINE | Facility: CLINIC | Age: 62
End: 2022-08-19
Payer: COMMERCIAL

## 2022-08-19 DIAGNOSIS — C43.9 MALIGNANT MELANOMA OF SKIN (CMS/HCC): ICD-10-CM

## 2022-08-19 DIAGNOSIS — U07.1 COVID-19: Primary | ICD-10-CM

## 2022-08-19 DIAGNOSIS — E66.01 OBESITY, CLASS III, BMI 40-49.9 (MORBID OBESITY) (CMS/HCC): ICD-10-CM

## 2022-08-19 DIAGNOSIS — F41.9 ANXIETY: ICD-10-CM

## 2022-08-19 DIAGNOSIS — J45.41 MODERATE PERSISTENT ASTHMA WITH ACUTE EXACERBATION: ICD-10-CM

## 2022-08-19 DIAGNOSIS — R05.9 COUGH: ICD-10-CM

## 2022-08-19 DIAGNOSIS — E03.9 ACQUIRED HYPOTHYROIDISM: ICD-10-CM

## 2022-08-19 PROCEDURE — 99214 OFFICE O/P EST MOD 30 MIN: CPT | Mod: 95 | Performed by: FAMILY MEDICINE

## 2022-08-19 RX ORDER — LEVOTHYROXINE SODIUM 100 UG/1
TABLET ORAL
Qty: 90 TABLET | Refills: 0 | Status: SHIPPED | OUTPATIENT
Start: 2022-08-19 | End: 2022-11-15

## 2022-08-19 ASSESSMENT — ENCOUNTER SYMPTOMS
FATIGUE: 0
COUGH: 1
CONSTITUTIONAL NEGATIVE: 1
GASTROINTESTINAL NEGATIVE: 1
CARDIOVASCULAR NEGATIVE: 1
NEUROLOGICAL NEGATIVE: 1
HEMATOLOGIC/LYMPHATIC NEGATIVE: 1
WHEEZING: 0
FEVER: 0
CHILLS: 0

## 2022-08-19 NOTE — PROGRESS NOTES
Verification of Patient Location:  The patient affirms they are currently located in the following state: [place]    Request for Consent:    Audio and Video Encounter   Jv, my name is Rubi DO Zully.  Before we proceed, can you please verify your identification by telling me your full name and date of birth?  Can you tell me who is in the room with you?    You and I are about to have a telemedicine check-in or visit because you have requested it.  This is a live video-conference.  I am a real person, speaking to you in real time.  There is no one else with me on the video-conference.  However, when we use ([employer], [employer], etc) it is important for you to know that the video-conference may not be secure or private.  I am not recording this conversation and I am asking you not to record it.  This telemedicine visit will be billed to your health insurance or you, if you are self-insured.  You understand you will be responsible for any copayments or coinsurances that apply to your telemedicine visit.  Communication platform used for this encounter:  [employer] Video Visit (with Zoom integration)     Before starting our telemedicine visit, I am required to get your consent for this virtual check-in or visit by telemedicine. Do you consent?      Patient Response to Request for Consent:  Yes      Visit Documentation:  Subjective     Patient ID: Edmundo Dumas is a 62 y.o. female.  1960      Pt is here via telemedicine.  Pt has achiness , congestion in the head too, coughing, pt has had sx for a few days head congested . Using advair religiously now ,  Pulse ox 96-98 %, stuffiness  in hed. Clear, not coughing up any thing   Not too bad at present .  Has rescue inhaler but not really using.  No fever.        The following have been reviewed and updated as appropriate in this visit:   Tobacco  Allergies  Meds  Problems  Med Hx  Surg Hx  Fam Hx         Review of Systems   Constitutional: Negative.   Negative for chills, fatigue and fever.   HENT: Positive for congestion.    Respiratory: Positive for cough (mild. hx of asthma). Negative for wheezing.    Cardiovascular: Negative.    Gastrointestinal: Negative.    Genitourinary: Negative.    Musculoskeletal: Positive for arthralgias.   Skin: Negative for rash.   Neurological: Negative.    Hematological: Negative.          Assessment/Plan   Diagnoses and all orders for this visit:    COVID-19 (Primary)  Assessment & Plan:  Using sx care  Can not use the Paxlovid  Consider other oral antiviral  Add rescue neb TID  Follow pulse ox  Consider steroid add on       Malignant melanoma of skin (CMS/[facility])  Assessment & Plan:  Follows with derm regularly      Moderate persistent asthma with acute exacerbation  Assessment & Plan:  Continue Advair  Use the rescue TID or the nebulizer  No need for Abx at this time  Considered antiviral but contraindicated with the Advair   Can consider molnupiriveer - sent in to have if covered and not improving  Has needed steroids in  past so may benefit from adding  this on         BMI 40.0-44.9, adult (CMS/[facility])  Assessment & Plan:  + COVID  Risk factor  consider add moldiniveer      Cough  Assessment & Plan:  + Covid       Obesity, Class III, BMI 40-49.9 (morbid obesity) (CMS/[facility])  Assessment & Plan:  See above        Anxiety  Assessment & Plan:  Stable no meds       Other orders  -     molnupiravir 200 mg capsule capsule; Take 4 capsules (800 mg total) by mouth every 12 (twelve) hours. For 5 days  -     predniSONE (DELTASONE) 20 mg tablet; Take 2 tablets for 2 days, 1.5 tablets for 2 days, 1 tablet for 2 days and 1/2 tablet for 2 days      Time Spent:  I spent 25 minutes on this date of service performing the following activities: obtaining history, entering orders, documenting and providing counseling and education.

## 2022-08-19 NOTE — TELEPHONE ENCOUNTER
Medicine Refill Request    Last Office: 10/14/2021   Last Consult Visit: Visit date not found  Last Telemedicine Visit: 8/19/2022 Brigitte Andrea, DO    Next Appointment: 12/6/2022      Current Outpatient Medications:   •  ADVAIR -21 mcg/actuation inhaler, INHALE 2 PUFFS 2 (TWO) TIMES A DAY. RINSE MOUTH WITH WATER AFTER USE TO REDUCE AFTERTASTE AND INCIDENCE OF CANDIDIASIS. DO NOT SWALLOW. FOR PATIENTS NOT ON A VENTILATOR, A SPACER IS RECOMMENDED TO BE USED WITH THIS MEDICATION/INHALER., Disp: 12 g, Rfl: 11  •  albuterol 2.5 mg /3 mL (0.083 %) nebulizer solution, Take 3 mL (2.5 mg total) by nebulization every 6 (six) hours as needed for wheezing., Disp: 75 mL, Rfl: 1  •  albuterol HFA (VENTOLIN HFA) 90 mcg/actuation inhaler, Inhale 2 puffs every 4 (four) hours as needed for wheezing., Disp: 1 each, Rfl: 1  •  cholecalciferol, vitamin D3, (VITAMIN D3) 2,000 unit capsule, No SIG Entered, Disp: , Rfl:   •  famotidine (PEPCID) 40 mg tablet, TAKE 1 TABLET BY MOUTH TWICE A DAY, Disp: 180 tablet, Rfl: 1  •  fluticasone propionate (FLONASE) 50 mcg/actuation nasal spray, Administer 2 sprays into each nostril daily. (Patient taking differently: Administer 2 sprays into each nostril daily as needed.  ), Disp: 1 Bottle, Rfl: 5  •  levalbuterol (XOPENEX) 0.63 mg/3 mL nebulizer solution, Take 1 ampule by nebulization 3 (three) times a day., Disp: 72 mL, Rfl: 11  •  molnupiravir 200 mg capsule capsule, Take 4 capsules (800 mg total) by mouth every 12 (twelve) hours. For 5 days, Disp: 40 capsule, Rfl: 0  •  omeprazole (PriLOSEC) 40 mg capsule, TAKE 1 CAPSULE BY MOUTH EVERY DAY BEFORE BREAKFAST, Disp: 30 capsule, Rfl: 1  •  predniSONE (DELTASONE) 10 mg tablet, Take 4 tablets daily for 2 days, then 3 tablets daily for 2 days, then 2 tablets daily for 2 days, then 1 tablet daily for 2 days., Disp: 20 tablet, Rfl: 0  •  SYNTHROID 100 mcg tablet, TAKE 1 TABLET BY MOUTH EVERY DAY, Disp: 90 tablet, Rfl: 0      BP Readings from  Last 3 Encounters:   10/14/21 120/70   07/09/21 130/90   03/25/21 122/70       Recent Lab results:  Lab Results   Component Value Date    CHOL 211 (H) 12/30/2020   ,   Lab Results   Component Value Date    HDL 74 12/30/2020   ,   Lab Results   Component Value Date    LDLCALC 124 (H) 12/30/2020   ,   Lab Results   Component Value Date    TRIG 74 12/30/2020        Lab Results   Component Value Date    GLUCOSE 101 (H) 10/14/2021   ,   Lab Results   Component Value Date    HGBA1C 5.8 (H) 12/30/2020         Lab Results   Component Value Date    CREATININE 0.77 10/14/2021       Lab Results   Component Value Date    TSH 1.180 12/30/2020

## 2022-08-20 RX ORDER — PREDNISONE 20 MG/1
TABLET ORAL
Qty: 10 TABLET | Refills: 0 | Status: SHIPPED | OUTPATIENT
Start: 2022-08-20 | End: 2022-11-15 | Stop reason: ALTCHOICE

## 2022-08-20 ASSESSMENT — ENCOUNTER SYMPTOMS: ARTHRALGIAS: 1

## 2022-08-20 NOTE — ASSESSMENT & PLAN NOTE
Continue Advair  Use the rescue TID or the nebulizer  No need for Abx at this time  Considered antiviral but contraindicated with the Advair   Can consider molnupiriveer - sent in to have if covered and not improving  Has needed steroids in  past so may benefit from adding  this on

## 2022-08-20 NOTE — ASSESSMENT & PLAN NOTE
Using sx care  Can not use the Paxlovid  Consider other oral antiviral  Add rescue neb TID  Follow pulse ox  Consider steroid add on

## 2022-08-25 ENCOUNTER — TELEPHONE (OUTPATIENT)
Dept: FAMILY MEDICINE | Facility: CLINIC | Age: 62
End: 2022-08-25
Payer: COMMERCIAL

## 2022-08-25 DIAGNOSIS — J45.21 MILD INTERMITTENT ASTHMA WITH ACUTE EXACERBATION: ICD-10-CM

## 2022-08-25 RX ORDER — AZITHROMYCIN 250 MG/1
TABLET, FILM COATED ORAL
Qty: 6 TABLET | Refills: 0 | Status: SHIPPED | OUTPATIENT
Start: 2022-08-25 | End: 2022-08-30

## 2022-08-25 RX ORDER — ALBUTEROL SULFATE 0.83 MG/ML
2.5 SOLUTION RESPIRATORY (INHALATION) EVERY 6 HOURS PRN
Qty: 75 ML | Refills: 1 | Status: SHIPPED | OUTPATIENT
Start: 2022-08-25 | End: 2023-12-05 | Stop reason: ALTCHOICE

## 2022-08-26 NOTE — TELEPHONE ENCOUNTER
Patient is improving.  Still stuffy in the head and a cough.  Patient tends to get an asthmatic like bronchitis after upper respiratory illnesses.  Have been holding off but have decided to go ahead and add  z pack.  Filled nebulizer nebs

## 2022-11-04 ENCOUNTER — TELEMEDICINE (OUTPATIENT)
Dept: FAMILY MEDICINE | Facility: CLINIC | Age: 62
End: 2022-11-04
Payer: COMMERCIAL

## 2022-11-04 DIAGNOSIS — C43.9 MALIGNANT MELANOMA OF SKIN (CMS/HCC): ICD-10-CM

## 2022-11-04 DIAGNOSIS — J01.90 SUBACUTE SINUSITIS, UNSPECIFIED LOCATION: Primary | ICD-10-CM

## 2022-11-04 DIAGNOSIS — J45.21 MILD INTERMITTENT ASTHMA WITH ACUTE EXACERBATION: ICD-10-CM

## 2022-11-04 DIAGNOSIS — J45.909 UNCOMPLICATED ASTHMA, UNSPECIFIED ASTHMA SEVERITY, UNSPECIFIED WHETHER PERSISTENT: ICD-10-CM

## 2022-11-04 DIAGNOSIS — E66.01 OBESITY, CLASS III, BMI 40-49.9 (MORBID OBESITY) (CMS/HCC): ICD-10-CM

## 2022-11-04 PROCEDURE — 99213 OFFICE O/P EST LOW 20 MIN: CPT | Mod: 95 | Performed by: FAMILY MEDICINE

## 2022-11-04 RX ORDER — PREDNISONE 20 MG/1
TABLET ORAL
Qty: 10 TABLET | Refills: 0 | Status: SHIPPED | OUTPATIENT
Start: 2022-11-04 | End: 2022-11-15 | Stop reason: ALTCHOICE

## 2022-11-04 RX ORDER — LEVALBUTEROL INHALATION SOLUTION 0.63 MG/3ML
1 SOLUTION RESPIRATORY (INHALATION)
Qty: 72 ML | Refills: 11 | Status: SHIPPED | OUTPATIENT
Start: 2022-11-04 | End: 2024-08-23

## 2022-11-04 RX ORDER — AZITHROMYCIN 250 MG/1
TABLET, FILM COATED ORAL
Qty: 6 TABLET | Refills: 0 | Status: SHIPPED | OUTPATIENT
Start: 2022-11-04 | End: 2022-12-06 | Stop reason: ALTCHOICE

## 2022-11-04 ASSESSMENT — ENCOUNTER SYMPTOMS
DIZZINESS: 0
HEADACHES: 1
CHILLS: 0
MYALGIAS: 1
COUGH: 1
WHEEZING: 1
FEVER: 1

## 2022-11-04 NOTE — PROGRESS NOTES
Verification of Patient Location:  The patient affirms they are currently located in the following state: [place]    Request for Consent:    Audio and Video Encounter   Jv, my name is Brigitte Andrea DO.  Before we proceed, can you please verify your identification by telling me your full name and date of birth?  Can you tell me who is in the room with you?    You and I are about to have a telemedicine check-in or visit because you have requested it.  This is a live video-conference.  I am a real person, speaking to you in real time.  There is no one else with me on the video-conference. I am not recording this conversation and I am asking you not to record it.  This telemedicine visit will be billed to your health insurance or you, if you are self-insured.  You understand you will be responsible for any copayments or coinsurances that apply to your telemedicine visit.  Communication platform used for this encounter:  [employer] Video Visit ([employer])       Before starting our telemedicine visit, I am required to get your consent for this virtual check-in or visit by telemedicine. Do you consent?      Patient Response to Request for Consent:  Yes      Visit Documentation:  Subjective     Patient ID: Edmundo Dumas is a 62 y.o. female.  1960      Pt is here with c/o coughing. Pt has a hx of asthma, pt has a hx of asthmatic bronchitis, is on advair and albuterol.  advil and advil 100.7.  99 today .   Kids were sick.   Tested for covid and was neg.   Had this aug 17th.  Did not Star up nebs, heart rate .  Neb can worsen.   Pt has congestion in the nose,  Tends to get every virus that she is around with her grand kids,  Is taking her Advair HFA every day,       The following have been reviewed and updated as appropriate in this visit:        Review of Systems   Constitutional: Positive for fever. Negative for chills.   HENT: Positive for congestion and postnasal drip.    Respiratory:  Positive for cough and wheezing.    Musculoskeletal: Positive for myalgias.   Neurological: Positive for headaches. Negative for dizziness.     Video, non toxic, lot of nasal congestion, normal conversational     Assessment/Plan   Diagnoses and all orders for this visit:    Subacute sinusitis, unspecified location (Primary)    Uncomplicated asthma, unspecified asthma severity, unspecified whether persistent    Mild intermittent asthma with acute exacerbation    Obesity, Class III, BMI 40-49.9 (morbid obesity) (CMS/[facility])  Assessment & Plan:  Continues to struggle with elevated BMI  Will review any updated options in December       Malignant melanoma of skin (CMS/[facility])  Assessment & Plan:  Stable . follows with derm regularly       Other orders  -     azithromycin (ZITHROMAX) 250 mg tablet; Take 2 tablets the first day, then 1 tablet daily for 4 days.  -     levalbuterol (XOPENEX) 0.63 mg/3 mL nebulizer solution; Take 1 ampule by nebulization 3 (three) times a day.  -     predniSONE (DELTASONE) 20 mg tablet; Take 2 tablets for 2 days, 1.5 tablets for 2 days, 1 tablet for 2 days and 1/2 tablet for 2 days  as above. Hydrate, showers. Advair, prednisone and z pack . Suggested we get the Xopenex so pt can have this as an option if needs it ( tends to hold on the albuterol due to SE)    Did not get flu shot, will get at her 12/6/ OV     Time Spent:  I spent 21 minutes on this date of service performing the following activities: obtaining history, entering orders, documenting and providing counseling and education.

## 2022-11-15 DIAGNOSIS — E03.9 ACQUIRED HYPOTHYROIDISM: ICD-10-CM

## 2022-11-15 RX ORDER — LEVOTHYROXINE SODIUM 100 UG/1
TABLET ORAL
Qty: 90 TABLET | Refills: 0 | Status: SHIPPED | OUTPATIENT
Start: 2022-11-15 | End: 2023-02-15

## 2022-11-15 NOTE — TELEPHONE ENCOUNTER
Medicine Refill Request    Last Office: 10/14/2021   Last Consult Visit: Visit date not found  Last Telemedicine Visit: 11/4/2022 Brigitte Andrea, DO    Next Appointment: 12/6/2022      Current Outpatient Medications:     ADVAIR -21 mcg/actuation inhaler, INHALE 2 PUFFS 2 (TWO) TIMES A DAY. RINSE MOUTH WITH WATER AFTER USE TO REDUCE AFTERTASTE AND INCIDENCE OF CANDIDIASIS. DO NOT SWALLOW. FOR PATIENTS NOT ON A VENTILATOR, A SPACER IS RECOMMENDED TO BE USED WITH THIS MEDICATION/INHALER., Disp: 12 g, Rfl: 11    albuterol 2.5 mg /3 mL (0.083 %) nebulizer solution, Take 3 mL (2.5 mg total) by nebulization every 6 (six) hours as needed for wheezing., Disp: 75 mL, Rfl: 1    albuterol HFA (VENTOLIN HFA) 90 mcg/actuation inhaler, Inhale 2 puffs every 4 (four) hours as needed for wheezing., Disp: 1 each, Rfl: 1    cholecalciferol, vitamin D3, (VITAMIN D3) 2,000 unit capsule, No SIG Entered, Disp: , Rfl:     famotidine (PEPCID) 40 mg tablet, TAKE 1 TABLET BY MOUTH TWICE A DAY, Disp: 180 tablet, Rfl: 1    fluticasone propionate (FLONASE) 50 mcg/actuation nasal spray, Administer 2 sprays into each nostril daily. (Patient taking differently: Administer 2 sprays into each nostril daily as needed.  ), Disp: 1 Bottle, Rfl: 5    levalbuterol (XOPENEX) 0.63 mg/3 mL nebulizer solution, Take 1 ampule by nebulization 3 (three) times a day., Disp: 72 mL, Rfl: 11    molnupiravir 200 mg capsule capsule, Take 4 capsules (800 mg total) by mouth every 12 (twelve) hours. For 5 days, Disp: 40 capsule, Rfl: 0    omeprazole (PriLOSEC) 40 mg capsule, TAKE 1 CAPSULE BY MOUTH EVERY DAY BEFORE BREAKFAST, Disp: 30 capsule, Rfl: 1    predniSONE (DELTASONE) 10 mg tablet, Take 4 tablets daily for 2 days, then 3 tablets daily for 2 days, then 2 tablets daily for 2 days, then 1 tablet daily for 2 days., Disp: 20 tablet, Rfl: 0    predniSONE (DELTASONE) 20 mg tablet, Take 2 tablets for 2 days, 1.5 tablets for 2 days, 1 tablet for 2 days  and 1/2 tablet for 2 days, Disp: 10 tablet, Rfl: 0    predniSONE (DELTASONE) 20 mg tablet, Take 2 tablets for 2 days, 1.5 tablets for 2 days, 1 tablet for 2 days and 1/2 tablet for 2 days, Disp: 10 tablet, Rfl: 0    SYNTHROID 100 mcg tablet, TAKE 1 TABLET BY MOUTH EVERY DAY. FILL WITHOUT INSURANCE., Disp: 90 tablet, Rfl: 0      BP Readings from Last 3 Encounters:   10/14/21 120/70   07/09/21 130/90   03/25/21 122/70       Recent Lab results:  Lab Results   Component Value Date    CHOL 211 (H) 12/30/2020   ,   Lab Results   Component Value Date    HDL 74 12/30/2020   ,   Lab Results   Component Value Date    LDLCALC 124 (H) 12/30/2020   ,   Lab Results   Component Value Date    TRIG 74 12/30/2020        Lab Results   Component Value Date    GLUCOSE 101 (H) 10/14/2021   ,   Lab Results   Component Value Date    HGBA1C 5.8 (H) 12/30/2020         Lab Results   Component Value Date    CREATININE 0.77 10/14/2021       Lab Results   Component Value Date    TSH 1.180 12/30/2020

## 2022-12-06 ENCOUNTER — OFFICE VISIT (OUTPATIENT)
Dept: FAMILY MEDICINE | Facility: CLINIC | Age: 62
End: 2022-12-06
Payer: COMMERCIAL

## 2022-12-06 VITALS
OXYGEN SATURATION: 99 % | DIASTOLIC BLOOD PRESSURE: 80 MMHG | HEART RATE: 76 BPM | TEMPERATURE: 97.9 F | WEIGHT: 247.8 LBS | BODY MASS INDEX: 43.91 KG/M2 | SYSTOLIC BLOOD PRESSURE: 144 MMHG | HEIGHT: 63 IN | RESPIRATION RATE: 12 BRPM

## 2022-12-06 DIAGNOSIS — C43.9 MALIGNANT MELANOMA OF SKIN (CMS/HCC): ICD-10-CM

## 2022-12-06 DIAGNOSIS — K63.5 POLYP OF COLON, UNSPECIFIED PART OF COLON, UNSPECIFIED TYPE: ICD-10-CM

## 2022-12-06 DIAGNOSIS — J45.21 MILD INTERMITTENT ASTHMA WITH ACUTE EXACERBATION: ICD-10-CM

## 2022-12-06 DIAGNOSIS — E03.9 ACQUIRED HYPOTHYROIDISM: ICD-10-CM

## 2022-12-06 DIAGNOSIS — R12 HEARTBURN: ICD-10-CM

## 2022-12-06 DIAGNOSIS — F41.9 ANXIETY: ICD-10-CM

## 2022-12-06 DIAGNOSIS — Z12.31 SCREENING MAMMOGRAM, ENCOUNTER FOR: ICD-10-CM

## 2022-12-06 DIAGNOSIS — K21.00 GASTROESOPHAGEAL REFLUX DISEASE WITH ESOPHAGITIS WITHOUT HEMORRHAGE: ICD-10-CM

## 2022-12-06 DIAGNOSIS — Z00.00 ROUTINE HISTORY AND PHYSICAL EXAMINATION OF ADULT: Primary | ICD-10-CM

## 2022-12-06 DIAGNOSIS — E66.01 OBESITY, CLASS III, BMI 40-49.9 (MORBID OBESITY) (CMS/HCC): ICD-10-CM

## 2022-12-06 PROBLEM — S76.311A TEAR OF RIGHT HAMSTRING: Status: RESOLVED | Noted: 2021-04-13 | Resolved: 2022-12-06

## 2022-12-06 PROBLEM — R06.2 WHEEZING: Status: RESOLVED | Noted: 2021-07-09 | Resolved: 2022-12-06

## 2022-12-06 PROCEDURE — 3008F BODY MASS INDEX DOCD: CPT | Performed by: FAMILY MEDICINE

## 2022-12-06 PROCEDURE — 99396 PREV VISIT EST AGE 40-64: CPT | Performed by: FAMILY MEDICINE

## 2022-12-06 ASSESSMENT — PAIN SCALES - GENERAL: PAINLEVEL: 0-NO PAIN

## 2022-12-06 NOTE — PROGRESS NOTES
Subjective      Patient ID: Edmundo Dumas is a 62 y.o. female.    Pt is here for her physical. She has asthma, obesity, allergies. She continues to have issues with refluz sx if she misses does of her acid blocking medication        Tobacco  Allergies  Meds  Problems  Surg Hx  Fam Hx       Review of Systems   Constitutional: Negative.    HENT: Negative.    Eyes: Negative.    Respiratory: Negative.         Hx asthma   Cardiovascular: Negative.    Gastrointestinal: Negative.         Reflux/heartburn   Endocrine: Negative.    Genitourinary: Negative.    Musculoskeletal: Negative.    Skin: Negative.    Allergic/Immunologic: Negative.    Neurological: Negative.    Hematological: Negative.    Psychiatric/Behavioral: Negative.      Allergies   Allergen Reactions   • Contrast  [Iodinated Contrast Media] Nausea And Vomiting and Dermatitis   • Montelukast      Vision change   • Moxifloxacin Nausea And Vomiting and Dermatitis   • Penicillins      Other reaction(s): Hives   • Sulfa (Sulfonamide Antibiotics) Nausea And Vomiting     Current Outpatient Medications   Medication Sig Dispense Refill   • ADVAIR -21 mcg/actuation inhaler INHALE 2 PUFFS 2 (TWO) TIMES A DAY. RINSE MOUTH WITH WATER AFTER USE TO REDUCE AFTERTASTE AND INCIDENCE OF CANDIDIASIS. DO NOT SWALLOW. FOR PATIENTS NOT ON A VENTILATOR, A SPACER IS RECOMMENDED TO BE USED WITH THIS MEDICATION/INHALER. 12 g 11   • albuterol 2.5 mg /3 mL (0.083 %) nebulizer solution Take 3 mL (2.5 mg total) by nebulization every 6 (six) hours as needed for wheezing. 75 mL 1   • albuterol HFA (VENTOLIN HFA) 90 mcg/actuation inhaler Inhale 2 puffs every 4 (four) hours as needed for wheezing. 1 each 1   • cholecalciferol, vitamin D3, 50 mcg (2,000 unit) capsule No SIG Entered     • inhalational spacing device spacer 1 Device daily. 1 each 1   • levalbuterol (XOPENEX) 0.63 mg/3 mL nebulizer solution Take 1 ampule by nebulization 3 (three) times a day. 72 mL 11   • omeprazole  "(PriLOSEC) 40 mg capsule TAKE 1 CAPSULE BY MOUTH EVERY DAY BEFORE BREAKFAST 30 capsule 1   • SYNTHROID 100 mcg tablet TAKE 1 TABLET BY MOUTH EVERY DAY. FILL WITHOUT INSURANCE. 90 tablet 0   • famotidine (PEPCID) 40 mg tablet TAKE 1 TABLET BY MOUTH TWICE A DAY (Patient not taking: Reported on 12/6/2022) 180 tablet 1   • fluticasone propionate (FLONASE) 50 mcg/actuation nasal spray Administer 2 sprays into each nostril daily. (Patient taking differently: Administer 2 sprays into each nostril daily as needed.  ) 1 Bottle 5     No current facility-administered medications for this visit.     Past Medical History:   Diagnosis Date   • Hx of phlebitis    • Hypothyroid 4/10/2014    Overview:    • Malignant melanoma of skin (CMS/HCC) 4/10/2014    Overview:    • Mild intermittent asthma with acute exacerbation 4/10/2014    Overview:    • Reflux esophagitis 4/10/2014    Overview:      Past Surgical History:   Procedure Laterality Date   • POLYPECTOMY      from stomach   • THYROIDECTOMY       Social History     Tobacco Use   • Smoking status: Never   • Smokeless tobacco: Never   Vaping Use   • Vaping Use: Never used   Substance Use Topics   • Alcohol use: Not Currently   • Drug use: Never     Family History   Problem Relation Age of Onset   • Heart disease Biological Mother    • Arrhythmia Biological Mother    • Hypertension Biological Mother        Objective   Vitals:    12/06/22 1302   BP: (!) 144/80   BP Location: Left upper arm   Patient Position: Sitting   Pulse: 76   Resp: 12   Temp: 36.6 °C (97.9 °F)   TempSrc: Temporal   SpO2: 99%   Weight: 112 kg (247 lb 12.8 oz)   Height: 1.588 m (5' 2.5\")    Body mass index is 44.6 kg/m².  Physical Exam  Vitals reviewed.   Constitutional:       Appearance: Normal appearance. She is obese. She is not toxic-appearing.   HENT:      Head: Normocephalic and atraumatic.      Right Ear: Tympanic membrane and ear canal normal.      Left Ear: Tympanic membrane and ear canal normal.      " Nose: Nose normal. No congestion.      Mouth/Throat:      Mouth: Mucous membranes are moist.   Eyes:      Extraocular Movements: Extraocular movements intact.      Conjunctiva/sclera: Conjunctivae normal.      Pupils: Pupils are equal, round, and reactive to light.   Neck:      Vascular: No carotid bruit.   Cardiovascular:      Rate and Rhythm: Normal rate and regular rhythm.      Pulses: Normal pulses.      Heart sounds: No murmur heard.  Pulmonary:      Effort: Pulmonary effort is normal.      Breath sounds: Normal breath sounds. No wheezing or rhonchi.   Abdominal:      General: Abdomen is flat.      Palpations: Abdomen is soft.      Tenderness: There is no abdominal tenderness. There is no guarding or rebound.      Hernia: No hernia is present.   Musculoskeletal:         General: Normal range of motion.      Cervical back: Normal range of motion and neck supple.      Right lower leg: No edema.      Left lower leg: No edema.   Lymphadenopathy:      Cervical: No cervical adenopathy.   Skin:     General: Skin is warm.      Findings: No bruising, erythema, lesion or rash.   Neurological:      General: No focal deficit present.      Mental Status: She is alert and oriented to person, place, and time.   Psychiatric:         Mood and Affect: Mood normal.         Behavior: Behavior normal.         Assessment/Plan   Diagnoses and all orders for this visit:    Routine history and physical examination of adult (Primary)  Assessment & Plan:  Have repeat labs Rx given  See Gyn for exam   Have mammogram   Had moderna booster 1/5/22  Have a flu shot   Had covid infection 8/17/22   Have pneumovax or Prevnar 20 when turns 63      Orders:  -     CBC and Differential; Future  -     Comprehensive metabolic panel; Future  -     Lipid panel; Future  -     Hemoglobin A1c; Future  -     TSH 3rd Generation; Future  -     Urinalysis with microscopic; Future  -     Vitamin D 25 hydroxy; Future    Obesity, Class III, BMI 40-49.9 (morbid  obesity) (CMS/[facility])  Assessment & Plan:  Continues to struggle with elevated BMI  Pt is breathing better so trying to walk more regularly      Malignant melanoma of skin (CMS/[facility])  Assessment & Plan:  Stable pt follows with dermatology      BMI 40.0-44.9, adult (CMS/[facility])  Assessment & Plan:  Trying to start to work to diet and walk more    Orders:  -     CBC and Differential; Future  -     Comprehensive metabolic panel; Future  -     Lipid panel; Future  -     Hemoglobin A1c; Future  -     TSH 3rd Generation; Future  -     Urinalysis with microscopic; Future  -     Vitamin D 25 hydroxy; Future    Mild intermittent asthma with acute exacerbation  Assessment & Plan:  Stable. Pt is making sure she is taking her Advair twice a day ( she had been just doing if once a day )  Also make sure taking allergy meds      Orders:  -     inhalational spacing device spacer; 1 Device daily.    Polyp of colon, unspecified part of colon, unspecified type  Assessment & Plan:  Had in 2017 and then another Dr Avery at [facility]  Will try to get the copy of this  Due for this. Due to her heartburn despite good PPI have suggested perhaps having EGD when she has her next colonoscopy       Acquired hypothyroidism  Assessment & Plan:  Lab Results   Component Value Date    TSH 1.180 12/30/2020     Continue same dose of synthroid     Orders:  -     TSH 3rd Generation; Future    Screening mammogram, encounter for  -     BI SCREENING MAMMOGRAM BILATERAL(TOMOSYNTHESIS); Future    Gastroesophageal reflux disease with esophagitis without hemorrhage  Assessment & Plan:  Needs to be on the 40 mg of omeprazole   Prn pepcid       Anxiety  Assessment & Plan:  Stable. No meds      Heartburn  Assessment & Plan:  Omeprazole 40 mg  If misses will have sx  Can use prn famotidine if has pain on the PPI   Suggest EGD with upcoming colon      Try to get the last colonoscopy sent over     Patient agrees and verbalizes understanding of  medication and treatment plan discussed at today's visit.  Patient was instructed to call or follow-up if symptoms persist or worsen.         Brigitte Andrea, DO      This note was created with voice recognition software. Inadvertent dictation errors should be disregarded. Please contact my office for clarification.

## 2022-12-06 NOTE — LETTER
December 6, 2022     Edmundo Montalvojarad    Patient: Edmundo Dumas   YOB: 1960   Date of Visit: 12/6/2022       Dear Dr. Dumas:    I am referring my patient, Edmundo Dumas, to you for evaluation of heartburn. Pt has heartburn.  Patient currently on omeprazole 40 mg daily.  If patient misses a dose she will have symptoms.  Also even on the medication she can have breakthrough symptoms.  She is due for her upcoming routine colonoscopy and due to her persistent above symptoms think it would be helpful to do an EGD at the same time.  Please let me know if you need any other information.    I appreciate your assistance in her care and look forward to your findings and recommendations.         Sincerely,                             Brigitte Andrea,         CC: No Recipients

## 2022-12-11 PROBLEM — R09.81 NASAL CONGESTION: Status: RESOLVED | Noted: 2019-09-18 | Resolved: 2022-12-11

## 2022-12-11 PROBLEM — R53.1 DECREASED STRENGTH: Status: RESOLVED | Noted: 2021-05-11 | Resolved: 2022-12-11

## 2022-12-11 PROBLEM — J01.90 SUBACUTE SINUSITIS: Status: RESOLVED | Noted: 2022-11-04 | Resolved: 2022-12-11

## 2022-12-11 PROBLEM — U07.1 COVID-19: Status: RESOLVED | Noted: 2022-08-19 | Resolved: 2022-12-11

## 2022-12-11 PROBLEM — R05.9 COUGH: Status: RESOLVED | Noted: 2019-09-18 | Resolved: 2022-12-11

## 2022-12-11 ASSESSMENT — ENCOUNTER SYMPTOMS
ENDOCRINE NEGATIVE: 1
HEMATOLOGIC/LYMPHATIC NEGATIVE: 1
ALLERGIC/IMMUNOLOGIC NEGATIVE: 1
GASTROINTESTINAL NEGATIVE: 1
PSYCHIATRIC NEGATIVE: 1
CONSTITUTIONAL NEGATIVE: 1
CARDIOVASCULAR NEGATIVE: 1
EYES NEGATIVE: 1
MUSCULOSKELETAL NEGATIVE: 1
RESPIRATORY NEGATIVE: 1
NEUROLOGICAL NEGATIVE: 1

## 2022-12-12 NOTE — ASSESSMENT & PLAN NOTE
Had in 2017 and then another Dr Avery at [facility]  Will try to get the copy of this  Due for this. Due to her heartburn despite good PPI have suggested perhaps having EGD when she has her next colonoscopy

## 2022-12-12 NOTE — ASSESSMENT & PLAN NOTE
Have repeat labs Rx given  See Gyn for exam   Have mammogram   Had moderna booster 1/5/22  Have a flu shot   Had covid infection 8/17/22   Have pneumovax or Prevnar 20 when turns 63

## 2022-12-12 NOTE — ASSESSMENT & PLAN NOTE
Stable. Pt is making sure she is taking her Advair twice a day ( she had been just doing if once a day )  Also make sure taking allergy meds

## 2022-12-14 ENCOUNTER — TELEPHONE (OUTPATIENT)
Dept: FAMILY MEDICINE | Facility: CLINIC | Age: 62
End: 2022-12-14
Payer: COMMERCIAL

## 2022-12-14 NOTE — TELEPHONE ENCOUNTER
"Request was sent to [facility] to obtain colonoscopy report.    Response received, \"no record found for service requested at [facility]\".  "

## 2022-12-15 PROBLEM — M79.604 LEG PAIN, RIGHT: Status: RESOLVED | Noted: 2021-05-11 | Resolved: 2022-12-15

## 2022-12-15 LAB
25(OH)D3 SERPL-MCNC: 41 NG/ML (ref 30–100)
ALBUMIN SERPL-MCNC: 4 G/DL (ref 3.6–5.1)
ALBUMIN/GLOB SERPL: 1.5 (CALC) (ref 1–2.5)
ALP SERPL-CCNC: 60 U/L (ref 37–153)
ALT SERPL-CCNC: 16 U/L (ref 6–29)
APPEARANCE UR: CLEAR
AST SERPL-CCNC: 14 U/L (ref 10–35)
BACTERIA #/AREA URNS HPF: ABNORMAL /HPF
BASOPHILS # BLD AUTO: 41 CELLS/UL (ref 0–200)
BASOPHILS NFR BLD AUTO: 0.7 %
BILIRUB SERPL-MCNC: 0.5 MG/DL (ref 0.2–1.2)
BILIRUB UR QL STRIP: NEGATIVE
BUN SERPL-MCNC: 21 MG/DL (ref 7–25)
BUN/CREAT SERPL: NORMAL (CALC) (ref 6–22)
CALCIUM SERPL-MCNC: 9.4 MG/DL (ref 8.6–10.4)
CAOX CRY #/AREA URNS HPF: ABNORMAL /HPF
CHLORIDE SERPL-SCNC: 105 MMOL/L (ref 98–110)
CHOLEST SERPL-MCNC: 202 MG/DL
CHOLEST/HDLC SERPL: 2.9 (CALC)
CO2 SERPL-SCNC: 30 MMOL/L (ref 20–32)
COLOR UR: YELLOW
CREAT SERPL-MCNC: 0.72 MG/DL (ref 0.5–1.05)
EGFRCR SERPLBLD CKD-EPI 2021: 93 ML/MIN/1.73M2
EOSINOPHIL # BLD AUTO: 99 CELLS/UL (ref 15–500)
EOSINOPHIL NFR BLD AUTO: 1.7 %
ERYTHROCYTE [DISTWIDTH] IN BLOOD BY AUTOMATED COUNT: 12.3 % (ref 11–15)
GLOBULIN SER CALC-MCNC: 2.6 G/DL (CALC) (ref 1.9–3.7)
GLUCOSE SERPL-MCNC: 91 MG/DL (ref 65–99)
GLUCOSE UR QL STRIP: NEGATIVE
HBA1C MFR BLD: 5.5 % OF TOTAL HGB
HCT VFR BLD AUTO: 39.1 % (ref 35–45)
HDLC SERPL-MCNC: 70 MG/DL
HGB BLD-MCNC: 13.4 G/DL (ref 11.7–15.5)
HGB UR QL STRIP: NEGATIVE
HYALINE CASTS #/AREA URNS LPF: ABNORMAL /LPF
KETONES UR QL STRIP: NEGATIVE
LDLC SERPL CALC-MCNC: 117 MG/DL (CALC)
LEUKOCYTE ESTERASE UR QL STRIP: NEGATIVE
LYMPHOCYTES # BLD AUTO: 2001 CELLS/UL (ref 850–3900)
LYMPHOCYTES NFR BLD AUTO: 34.5 %
MCH RBC QN AUTO: 30.7 PG (ref 27–33)
MCHC RBC AUTO-ENTMCNC: 34.3 G/DL (ref 32–36)
MCV RBC AUTO: 89.5 FL (ref 80–100)
MONOCYTES # BLD AUTO: 429 CELLS/UL (ref 200–950)
MONOCYTES NFR BLD AUTO: 7.4 %
NEUTROPHILS # BLD AUTO: 3231 CELLS/UL (ref 1500–7800)
NEUTROPHILS NFR BLD AUTO: 55.7 %
NITRITE UR QL STRIP: NEGATIVE
NONHDLC SERPL-MCNC: 132 MG/DL (CALC)
PH UR STRIP: 7 [PH] (ref 5–8)
PLATELET # BLD AUTO: 248 THOUSAND/UL (ref 140–400)
PMV BLD REES-ECKER: 12.4 FL (ref 7.5–12.5)
POTASSIUM SERPL-SCNC: 4.2 MMOL/L (ref 3.5–5.3)
PROT SERPL-MCNC: 6.6 G/DL (ref 6.1–8.1)
PROT UR QL STRIP: NEGATIVE
RBC # BLD AUTO: 4.37 MILLION/UL (ref 3.8–5.1)
RBC #/AREA URNS HPF: ABNORMAL /HPF
SERVICE CMNT-IMP: ABNORMAL
SODIUM SERPL-SCNC: 142 MMOL/L (ref 135–146)
SP GR UR STRIP: 1.02 (ref 1–1.03)
SQUAMOUS #/AREA URNS HPF: ABNORMAL /HPF
TRIGL SERPL-MCNC: 61 MG/DL
TSH SERPL-ACNC: 1.47 MIU/L (ref 0.4–4.5)
WBC # BLD AUTO: 5.8 THOUSAND/UL (ref 3.8–10.8)
WBC #/AREA URNS HPF: ABNORMAL /HPF

## 2022-12-15 ASSESSMENT — ENCOUNTER SYMPTOMS: ROS GI COMMENTS: REFLUX/HEARTBURN

## 2022-12-15 NOTE — ASSESSMENT & PLAN NOTE
Omeprazole 40 mg  If misses will have sx  Can use prn famotidine if has pain on the PPI   Suggest EGD with upcoming colon

## 2022-12-16 NOTE — RESULT ENCOUNTER NOTE
Abrahan De Santiago  Your labs are available for review.  CBC CMP hemoglobin A1c TSH were all in normal range.  Your hemoglobin A1c went into the normal range from the prediabetic range which is great.  Your fasting blood sugar went from 10 1-91 your cholesterol was slightly better this time.  MD is okay.  Any specific questions let me now.  Recommend recheck lipids and A1c in 6 months

## 2023-02-02 ENCOUNTER — TELEPHONE (OUTPATIENT)
Dept: FAMILY MEDICINE | Facility: CLINIC | Age: 63
End: 2023-02-02
Payer: COMMERCIAL

## 2023-02-05 NOTE — TELEPHONE ENCOUNTER
Left VM for pharmacist, med approved. Pharmacy instructed to contact patient, when script is ready for .

## 2023-02-12 DIAGNOSIS — E03.9 ACQUIRED HYPOTHYROIDISM: ICD-10-CM

## 2023-02-13 NOTE — TELEPHONE ENCOUNTER
Medicine Refill Request    Last Office: 12/6/2022   Last Consult Visit: Visit date not found  Last Telemedicine Visit: 11/4/2022 Brigitte Andrea, DO    Next Appointment: 12/5/2023      Current Outpatient Medications:   •  ADVAIR -21 mcg/actuation inhaler, INHALE 2 PUFFS 2 (TWO) TIMES A DAY. RINSE MOUTH WITH WATER AFTER USE TO REDUCE AFTERTASTE AND INCIDENCE OF CANDIDIASIS. DO NOT SWALLOW. FOR PATIENTS NOT ON A VENTILATOR, A SPACER IS RECOMMENDED TO BE USED WITH THIS MEDICATION/INHALER., Disp: 12 g, Rfl: 11  •  albuterol 2.5 mg /3 mL (0.083 %) nebulizer solution, Take 3 mL (2.5 mg total) by nebulization every 6 (six) hours as needed for wheezing., Disp: 75 mL, Rfl: 1  •  albuterol HFA (VENTOLIN HFA) 90 mcg/actuation inhaler, Inhale 2 puffs every 4 (four) hours as needed for wheezing., Disp: 1 each, Rfl: 1  •  cholecalciferol, vitamin D3, 50 mcg (2,000 unit) capsule, No SIG Entered, Disp: , Rfl:   •  famotidine (PEPCID) 40 mg tablet, TAKE 1 TABLET BY MOUTH TWICE A DAY (Patient not taking: Reported on 12/6/2022), Disp: 180 tablet, Rfl: 1  •  fluticasone propionate (FLONASE) 50 mcg/actuation nasal spray, Administer 2 sprays into each nostril daily. (Patient taking differently: Administer 2 sprays into each nostril daily as needed.  ), Disp: 1 Bottle, Rfl: 5  •  inhalational spacing device spacer, 1 Device daily., Disp: 1 each, Rfl: 1  •  levalbuterol (XOPENEX) 0.63 mg/3 mL nebulizer solution, Take 1 ampule by nebulization 3 (three) times a day., Disp: 72 mL, Rfl: 11  •  omeprazole (PriLOSEC) 40 mg capsule, TAKE 1 CAPSULE BY MOUTH EVERY DAY BEFORE BREAKFAST, Disp: 30 capsule, Rfl: 1  •  SYNTHROID 100 mcg tablet, TAKE 1 TABLET BY MOUTH EVERY DAY. FILL WITHOUT INSURANCE., Disp: 90 tablet, Rfl: 0      BP Readings from Last 3 Encounters:   12/06/22 (!) 144/80   10/14/21 120/70   07/09/21 130/90       Recent Lab results:  Lab Results   Component Value Date    CHOL 202 (H) 12/14/2022   ,   Lab Results   Component  Value Date    HDL 70 12/14/2022   ,   Lab Results   Component Value Date    LDLCALC 117 (H) 12/14/2022   ,   Lab Results   Component Value Date    TRIG 61 12/14/2022        Lab Results   Component Value Date    GLUCOSE 91 12/14/2022    GLUCOSE NEGATIVE 12/14/2022   ,   Lab Results   Component Value Date    HGBA1C 5.5 12/14/2022         Lab Results   Component Value Date    CREATININE 0.72 12/14/2022       Lab Results   Component Value Date    TSH 1.47 12/14/2022

## 2023-02-15 RX ORDER — LEVOTHYROXINE SODIUM 100 UG/1
TABLET ORAL
Qty: 90 TABLET | Refills: 0 | Status: SHIPPED | OUTPATIENT
Start: 2023-02-15 | End: 2023-03-14

## 2023-02-27 ENCOUNTER — HOSPITAL ENCOUNTER (OUTPATIENT)
Dept: RADIOLOGY | Facility: HOSPITAL | Age: 63
Discharge: HOME | End: 2023-02-27
Attending: FAMILY MEDICINE
Payer: COMMERCIAL

## 2023-02-27 ENCOUNTER — OFFICE VISIT (OUTPATIENT)
Dept: FAMILY MEDICINE | Facility: CLINIC | Age: 63
End: 2023-02-27
Payer: COMMERCIAL

## 2023-02-27 VITALS
DIASTOLIC BLOOD PRESSURE: 80 MMHG | RESPIRATION RATE: 18 BRPM | TEMPERATURE: 97.7 F | WEIGHT: 244.4 LBS | BODY MASS INDEX: 43.3 KG/M2 | HEART RATE: 90 BPM | OXYGEN SATURATION: 99 % | HEIGHT: 63 IN | SYSTOLIC BLOOD PRESSURE: 154 MMHG

## 2023-02-27 DIAGNOSIS — I82.461 ACUTE DEEP VEIN THROMBOSIS (DVT) OF CALF MUSCLE VEIN OF RIGHT LOWER EXTREMITY (CMS/HCC): ICD-10-CM

## 2023-02-27 DIAGNOSIS — Z86.718 HX OF DEEP VENOUS THROMBOSIS: ICD-10-CM

## 2023-02-27 DIAGNOSIS — E66.01 OBESITY, CLASS III, BMI 40-49.9 (MORBID OBESITY) (CMS/HCC): ICD-10-CM

## 2023-02-27 DIAGNOSIS — M79.661 RIGHT CALF PAIN: ICD-10-CM

## 2023-02-27 DIAGNOSIS — R55 VASOVAGAL SYNCOPE: ICD-10-CM

## 2023-02-27 DIAGNOSIS — J45.21 MILD INTERMITTENT ASTHMA WITH ACUTE EXACERBATION: ICD-10-CM

## 2023-02-27 DIAGNOSIS — S06.0X1D CONCUSSION WITH LOSS OF CONSCIOUSNESS OF 30 MINUTES OR LESS, SUBSEQUENT ENCOUNTER: ICD-10-CM

## 2023-02-27 DIAGNOSIS — E03.9 ACQUIRED HYPOTHYROIDISM: ICD-10-CM

## 2023-02-27 DIAGNOSIS — S09.90XD INJURY OF HEAD, SUBSEQUENT ENCOUNTER: Primary | ICD-10-CM

## 2023-02-27 DIAGNOSIS — C43.9 MALIGNANT MELANOMA OF SKIN (CMS/HCC): ICD-10-CM

## 2023-02-27 PROBLEM — S06.0XAA CONCUSSION: Status: ACTIVE | Noted: 2023-02-21

## 2023-02-27 PROBLEM — S09.90XA HEAD INJURY: Status: ACTIVE | Noted: 2023-02-27

## 2023-02-27 PROCEDURE — 3008F BODY MASS INDEX DOCD: CPT | Performed by: FAMILY MEDICINE

## 2023-02-27 PROCEDURE — 99214 OFFICE O/P EST MOD 30 MIN: CPT | Performed by: FAMILY MEDICINE

## 2023-02-27 PROCEDURE — 93971 EXTREMITY STUDY: CPT | Mod: RT

## 2023-02-28 PROBLEM — I82.461 ACUTE DEEP VEIN THROMBOSIS (DVT) OF CALF MUSCLE VEIN OF RIGHT LOWER EXTREMITY (CMS/HCC): Status: ACTIVE | Noted: 2023-02-28

## 2023-02-28 PROBLEM — I82.462 ACUTE DEEP VEIN THROMBOSIS (DVT) OF CALF MUSCLE VEIN OF LEFT LOWER EXTREMITY (CMS/HCC): Status: ACTIVE | Noted: 2023-02-28

## 2023-03-09 ENCOUNTER — TELEPHONE (OUTPATIENT)
Dept: FAMILY MEDICINE | Facility: CLINIC | Age: 63
End: 2023-03-09
Payer: COMMERCIAL

## 2023-03-10 NOTE — TELEPHONE ENCOUNTER
Pt called with some pleuritic chest pain that was very sudden then went away, no SOB, on xarleto. Discussed that since on Xarelto low risk of a clot.  She does have a DVT and is going to be on the Xarelto for 3 months.  Patient to make a follow-up appointment.  May have some musculoskeletal component and can use some heat.  No Advil or NSAIDs.  Tylenol.  If recurs consider ER

## 2023-03-13 ENCOUNTER — TELEPHONE (OUTPATIENT)
Dept: FAMILY MEDICINE | Facility: CLINIC | Age: 63
End: 2023-03-13
Payer: COMMERCIAL

## 2023-03-13 DIAGNOSIS — E03.9 ACQUIRED HYPOTHYROIDISM: ICD-10-CM

## 2023-03-13 DIAGNOSIS — J45.21 MILD INTERMITTENT ASTHMA WITH ACUTE EXACERBATION: Primary | ICD-10-CM

## 2023-03-13 NOTE — TELEPHONE ENCOUNTER
[facility] Appointment Request   Provider: Brigitte Andrea DO  Appointment Type: Follow up  Reason for Visit: follow up, recheck for concussion for the week of 03/20 per Dr. Andrea  Available Day and Time: 03/20-03/24  Best Contact Number: [phone]    The practice will reach out to schedule your appointment within the next 2 business days.

## 2023-03-13 NOTE — TELEPHONE ENCOUNTER
Medicine Refill Request    Last Office: 2/27/2023   Last Consult Visit: Visit date not found  Last Telemedicine Visit: 11/4/2022 Brigitte Andrea, DO    Next Appointment: 3/20/2023      Current Outpatient Medications:   •  ADVAIR -21 mcg/actuation inhaler, INHALE 2 PUFFS 2 (TWO) TIMES A DAY. RINSE MOUTH WITH WATER AFTER USE TO REDUCE AFTERTASTE AND INCIDENCE OF CANDIDIASIS. DO NOT SWALLOW. FOR PATIENTS NOT ON A VENTILATOR, A SPACER IS RECOMMENDED TO BE USED WITH THIS MEDICATION/INHALER., Disp: 12 g, Rfl: 11  •  albuterol 2.5 mg /3 mL (0.083 %) nebulizer solution, Take 3 mL (2.5 mg total) by nebulization every 6 (six) hours as needed for wheezing., Disp: 75 mL, Rfl: 1  •  albuterol HFA (VENTOLIN HFA) 90 mcg/actuation inhaler, Inhale 2 puffs every 4 (four) hours as needed for wheezing., Disp: 1 each, Rfl: 1  •  cholecalciferol, vitamin D3, 50 mcg (2,000 unit) capsule, No SIG Entered, Disp: , Rfl:   •  famotidine (PEPCID) 40 mg tablet, TAKE 1 TABLET BY MOUTH TWICE A DAY (Patient not taking: Reported on 12/6/2022), Disp: 180 tablet, Rfl: 1  •  fluticasone propionate (FLONASE) 50 mcg/actuation nasal spray, Administer 2 sprays into each nostril daily. (Patient taking differently: Administer 2 sprays into each nostril daily as needed.  ), Disp: 1 Bottle, Rfl: 5  •  inhalational spacing device spacer, 1 Device daily., Disp: 1 each, Rfl: 1  •  levalbuterol (XOPENEX) 0.63 mg/3 mL nebulizer solution, Take 1 ampule by nebulization 3 (three) times a day., Disp: 72 mL, Rfl: 11  •  omeprazole (PriLOSEC) 40 mg capsule, TAKE 1 CAPSULE BY MOUTH EVERY DAY BEFORE BREAKFAST, Disp: 30 capsule, Rfl: 1  •  rivaroxaban (XARELTO) 15 mg tablet, Take 1 tablet (15 mg total) by mouth 2 (two) times a day for 21 days., Disp: 42 tablet, Rfl: 0  •  [START ON 3/20/2023] rivaroxaban (XARELTO) 20 mg tablet, Take 1 tablet (20 mg total) by mouth daily with dinner., Disp: 30 tablet, Rfl: 1  •  SYNTHROID 100 mcg tablet, TAKE 1 TABLET BY MOUTH  EVERY DAY. FILL WITHOUT INSURANCE., Disp: 90 tablet, Rfl: 0      BP Readings from Last 3 Encounters:   02/27/23 (!) 154/80   12/06/22 (!) 144/80   10/14/21 120/70       Recent Lab results:  Lab Results   Component Value Date    CHOL 202 (H) 12/14/2022   ,   Lab Results   Component Value Date    HDL 70 12/14/2022   ,   Lab Results   Component Value Date    LDLCALC 117 (H) 12/14/2022   ,   Lab Results   Component Value Date    TRIG 61 12/14/2022        Lab Results   Component Value Date    GLUCOSE 91 12/14/2022    GLUCOSE NEGATIVE 12/14/2022   ,   Lab Results   Component Value Date    HGBA1C 5.5 12/14/2022         Lab Results   Component Value Date    CREATININE 0.72 12/14/2022       Lab Results   Component Value Date    TSH 1.47 12/14/2022

## 2023-03-14 RX ORDER — LEVOTHYROXINE SODIUM 100 UG/1
TABLET ORAL
Qty: 90 TABLET | Refills: 0 | Status: SHIPPED | OUTPATIENT
Start: 2023-03-14 | End: 2023-08-04

## 2023-03-14 RX ORDER — ALBUTEROL SULFATE 90 UG/1
INHALANT RESPIRATORY (INHALATION)
Qty: 6.7 EACH | Refills: 1 | Status: SHIPPED | OUTPATIENT
Start: 2023-03-14 | End: 2024-02-21

## 2023-03-20 ENCOUNTER — OFFICE VISIT (OUTPATIENT)
Dept: FAMILY MEDICINE | Facility: CLINIC | Age: 63
End: 2023-03-20
Payer: COMMERCIAL

## 2023-03-20 VITALS
RESPIRATION RATE: 16 BRPM | BODY MASS INDEX: 42.91 KG/M2 | HEART RATE: 86 BPM | SYSTOLIC BLOOD PRESSURE: 122 MMHG | WEIGHT: 242.2 LBS | HEIGHT: 63 IN | DIASTOLIC BLOOD PRESSURE: 88 MMHG | TEMPERATURE: 97.8 F

## 2023-03-20 DIAGNOSIS — E03.9 ACQUIRED HYPOTHYROIDISM: ICD-10-CM

## 2023-03-20 DIAGNOSIS — S09.90XD INJURY OF HEAD, SUBSEQUENT ENCOUNTER: ICD-10-CM

## 2023-03-20 DIAGNOSIS — R55 VASOVAGAL SYNCOPE: ICD-10-CM

## 2023-03-20 DIAGNOSIS — J45.21 MILD INTERMITTENT ASTHMA WITH ACUTE EXACERBATION: ICD-10-CM

## 2023-03-20 DIAGNOSIS — F41.9 ANXIETY: ICD-10-CM

## 2023-03-20 DIAGNOSIS — S06.0X1D CONCUSSION WITH LOSS OF CONSCIOUSNESS OF 30 MINUTES OR LESS, SUBSEQUENT ENCOUNTER: ICD-10-CM

## 2023-03-20 DIAGNOSIS — Z86.718 HX OF DEEP VENOUS THROMBOSIS: ICD-10-CM

## 2023-03-20 DIAGNOSIS — I82.461 ACUTE DEEP VEIN THROMBOSIS (DVT) OF CALF MUSCLE VEIN OF RIGHT LOWER EXTREMITY (CMS/HCC): Primary | ICD-10-CM

## 2023-03-20 DIAGNOSIS — C43.9 MALIGNANT MELANOMA OF SKIN (CMS/HCC): ICD-10-CM

## 2023-03-20 DIAGNOSIS — E66.01 OBESITY, CLASS III, BMI 40-49.9 (MORBID OBESITY) (CMS/HCC): ICD-10-CM

## 2023-03-20 PROCEDURE — 99214 OFFICE O/P EST MOD 30 MIN: CPT | Performed by: FAMILY MEDICINE

## 2023-03-20 PROCEDURE — 3008F BODY MASS INDEX DOCD: CPT | Performed by: FAMILY MEDICINE

## 2023-03-20 ASSESSMENT — ENCOUNTER SYMPTOMS
HEMATOLOGIC/LYMPHATIC NEGATIVE: 1
CARDIOVASCULAR NEGATIVE: 1
BACK PAIN: 1
LIGHT-HEADEDNESS: 1
DIZZINESS: 1
RESPIRATORY NEGATIVE: 1
GASTROINTESTINAL NEGATIVE: 1
HEADACHES: 1
CONSTITUTIONAL NEGATIVE: 1

## 2023-03-20 NOTE — PROGRESS NOTES
Subjective      Patient ID: Edmundo Dumas is a 63 y.o. female.    Patient is here for follow-up after being found to have a DVT.  Patient was also here for follow-up after having some concussion symptoms.  Has had CT of her head x2.  Pt notes last 2-3 days feeling better.  Not as tired   Not as many headaches,  Dizziness is a little better.   Leg feels better too. Less swelling. No bleeding        Tobacco  Allergies  Meds  Problems  Med Hx  Surg Hx  Fam Hx       Review of Systems   Constitutional: Negative.    HENT: Negative.    Respiratory: Negative.    Cardiovascular: Negative.    Gastrointestinal: Negative.    Musculoskeletal: Positive for back pain.   Skin: Negative for rash.   Neurological: Positive for dizziness, light-headedness and headaches.   Hematological: Negative.         DVT.  Improved pain swelling   Psychiatric/Behavioral: Negative.      Allergies   Allergen Reactions   • Contrast  [Iodinated Contrast Media] Nausea And Vomiting and Dermatitis   • Montelukast      Vision change   • Moxifloxacin Nausea And Vomiting and Dermatitis   • Penicillins      Other reaction(s): Hives   • Sulfa (Sulfonamide Antibiotics) Nausea And Vomiting     Current Outpatient Medications   Medication Sig Dispense Refill   • ADVAIR -21 mcg/actuation inhaler INHALE 2 PUFFS 2 (TWO) TIMES A DAY. RINSE MOUTH WITH WATER AFTER USE TO REDUCE AFTERTASTE AND INCIDENCE OF CANDIDIASIS. DO NOT SWALLOW. FOR PATIENTS NOT ON A VENTILATOR, A SPACER IS RECOMMENDED TO BE USED WITH THIS MEDICATION/INHALER. 12 g 11   • albuterol 2.5 mg /3 mL (0.083 %) nebulizer solution Take 3 mL (2.5 mg total) by nebulization every 6 (six) hours as needed for wheezing. 75 mL 1   • albuterol HFA 90 mcg/actuation inhaler TAKE 2 PUFFS BY MOUTH EVERY 4 HOURS AS NEEDED FOR WHEEZE 6.7 each 1   • cholecalciferol, vitamin D3, 50 mcg (2,000 unit) capsule No SIG Entered     • fluticasone propionate (FLONASE) 50 mcg/actuation nasal spray Administer 2  "sprays into each nostril daily. (Patient taking differently: Administer 2 sprays into each nostril daily as needed.) 1 Bottle 5   • levalbuterol (XOPENEX) 0.63 mg/3 mL nebulizer solution Take 1 ampule by nebulization 3 (three) times a day. 72 mL 11   • omeprazole (PriLOSEC) 40 mg capsule TAKE 1 CAPSULE BY MOUTH EVERY DAY BEFORE BREAKFAST 30 capsule 1   • rivaroxaban (XARELTO) 20 mg tablet Take 1 tablet (20 mg total) by mouth daily with dinner. 30 tablet 1   • SYNTHROID 100 mcg tablet TAKE 1 TABLET BY MOUTH EVERY DAY 90 tablet 0   • VITAMIN B COMPLEX ORAL Take by mouth.     • inhalational spacing device spacer 1 Device daily. 1 each 1     No current facility-administered medications for this visit.     Past Medical History:   Diagnosis Date   • Hx of phlebitis    • Hypothyroid 4/10/2014    Overview:    • Malignant melanoma of skin (CMS/HCC) 4/10/2014    Overview:    • Mild intermittent asthma with acute exacerbation 4/10/2014    Overview:    • Reflux esophagitis 4/10/2014    Overview:      Past Surgical History:   Procedure Laterality Date   • POLYPECTOMY      from stomach   • THYROIDECTOMY       Social History     Tobacco Use   • Smoking status: Never   • Smokeless tobacco: Never   Vaping Use   • Vaping status: Never Used   Substance Use Topics   • Alcohol use: Not Currently   • Drug use: Never     Family History   Problem Relation Age of Onset   • Heart disease Biological Mother    • Arrhythmia Biological Mother    • Hypertension Biological Mother        Objective   Vitals:    03/20/23 1329   BP: 122/88   Pulse: 86   Resp: 16   Temp: 36.6 °C (97.8 °F)   Weight: 110 kg (242 lb 3.2 oz)   Height: 1.6 m (5' 3\")    Body mass index is 42.9 kg/m².  Physical Exam  Vitals reviewed.   Constitutional:       Appearance: She is normal weight.   HENT:      Right Ear: Tympanic membrane normal.      Left Ear: Tympanic membrane normal.      Mouth/Throat:      Mouth: Mucous membranes are moist.   Cardiovascular:      Rate and " Rhythm: Normal rate and regular rhythm.      Pulses: Normal pulses.      Heart sounds: Normal heart sounds.   Pulmonary:      Effort: Pulmonary effort is normal.      Breath sounds: Normal breath sounds.   Abdominal:      General: Abdomen is flat.   Musculoskeletal:      Cervical back: Normal range of motion.   Skin:     General: Skin is warm.      Findings: No erythema.   Neurological:      General: No focal deficit present.      Mental Status: She is alert and oriented to person, place, and time.         Assessment/Plan   Diagnoses and all orders for this visit:    Acute deep vein thrombosis (DVT) of calf muscle vein of right lower extremity (CMS/[facility]) (Primary)  Assessment & Plan:  2/28/23 started on xarelto   Follow up with US in end of May early June   Discuss heme consult then but will likely need lifelong anticoagulation       Orders:  -     Ultrasound venous leg bilateral; Future  -     Ambulatory referral to Hematology / Oncology; Future    Obesity, Class III, BMI 40-49.9 (morbid obesity) (CMS/[facility])  Assessment & Plan:  struggles despite working with   Try to walk and exercise daily       Vasovagal syncope  Assessment & Plan:  Came after GI bug.  Resolved, none since    Orders:  -     Ambulatory referral to Physical Therapy; Future    Mild intermittent asthma with acute exacerbation  Assessment & Plan:  Stable on the Advair  Using the higher dose       Malignant melanoma of skin (CMS/[facility])  Assessment & Plan:  Follows with derm regularly       Concussion with loss of consciousness of 30 minutes or less, subsequent encounter  Assessment & Plan:  S/p fall . Doing ok   Improving- brain rest    Orders:  -     Ambulatory referral to Physical Therapy; Future    Injury of head, subsequent encounter  Assessment & Plan:  Pt is improving from her head injury from fall after GI bug/passed out   Had neg CT scan   Probable concussion - improving, reviewed brain rest strategies      BMI 40.0-44.9, adult  (CMS/[facility])  Assessment & Plan:  BMI 42.9  Diet and exercise       Acquired hypothyroidism  Assessment & Plan:  TSH stable  Continue current dose      Hx of deep venous thrombosis  Assessment & Plan:  This is pt second one  Treat with xarelto for 3 months then re ultrasound and refer to hematology      Anxiety  Assessment & Plan:  Stable, no meds        Patient agrees and verbalizes understanding of medication and treatment plan discussed at today's visit.  Patient was instructed to call or follow-up if symptoms persist or worsen.         Brigitte Andrea, DO      This note was created with voice recognition software. Inadvertent dictation errors should be disregarded. Please contact my office for clarification.

## 2023-03-20 NOTE — ASSESSMENT & PLAN NOTE
2/28/23 started on xarelto   Follow up with US in end of May early June   Discuss heme consult then but will likely need lifelong anticoagulation

## 2023-03-26 ASSESSMENT — ENCOUNTER SYMPTOMS: PSYCHIATRIC NEGATIVE: 1

## 2023-03-26 NOTE — ASSESSMENT & PLAN NOTE
Pt is improving from her head injury from fall after GI bug/passed out   Had neg CT scan   Probable concussion - improving, reviewed brain rest strategies

## 2023-05-04 RX ORDER — RIVAROXABAN 20 MG/1
TABLET, FILM COATED ORAL
Qty: 30 TABLET | Refills: 1 | Status: SHIPPED | OUTPATIENT
Start: 2023-05-04 | End: 2023-06-13 | Stop reason: ALTCHOICE

## 2023-05-04 NOTE — TELEPHONE ENCOUNTER
Medicine Refill Request    Last Office: 3/20/2023   Last Consult Visit: Visit date not found  Last Telemedicine Visit: 11/4/2022 Brigitte Andrea, DO    Next Appointment: 12/5/2023      Current Outpatient Medications:   •  ADVAIR -21 mcg/actuation inhaler, INHALE 2 PUFFS 2 (TWO) TIMES A DAY. RINSE MOUTH WITH WATER AFTER USE TO REDUCE AFTERTASTE AND INCIDENCE OF CANDIDIASIS. DO NOT SWALLOW. FOR PATIENTS NOT ON A VENTILATOR, A SPACER IS RECOMMENDED TO BE USED WITH THIS MEDICATION/INHALER., Disp: 12 g, Rfl: 11  •  albuterol 2.5 mg /3 mL (0.083 %) nebulizer solution, Take 3 mL (2.5 mg total) by nebulization every 6 (six) hours as needed for wheezing., Disp: 75 mL, Rfl: 1  •  albuterol HFA 90 mcg/actuation inhaler, TAKE 2 PUFFS BY MOUTH EVERY 4 HOURS AS NEEDED FOR WHEEZE, Disp: 6.7 each, Rfl: 1  •  cholecalciferol, vitamin D3, 50 mcg (2,000 unit) capsule, No SIG Entered, Disp: , Rfl:   •  fluticasone propionate (FLONASE) 50 mcg/actuation nasal spray, Administer 2 sprays into each nostril daily. (Patient taking differently: Administer 2 sprays into each nostril daily as needed.), Disp: 1 Bottle, Rfl: 5  •  inhalational spacing device spacer, 1 Device daily., Disp: 1 each, Rfl: 1  •  levalbuterol (XOPENEX) 0.63 mg/3 mL nebulizer solution, Take 1 ampule by nebulization 3 (three) times a day., Disp: 72 mL, Rfl: 11  •  omeprazole (PriLOSEC) 40 mg capsule, TAKE 1 CAPSULE BY MOUTH EVERY DAY BEFORE BREAKFAST, Disp: 30 capsule, Rfl: 1  •  rivaroxaban (XARELTO) 20 mg tablet, Take 1 tablet (20 mg total) by mouth daily with dinner., Disp: 30 tablet, Rfl: 1  •  SYNTHROID 100 mcg tablet, TAKE 1 TABLET BY MOUTH EVERY DAY, Disp: 90 tablet, Rfl: 0  •  VITAMIN B COMPLEX ORAL, Take by mouth., Disp: , Rfl:       BP Readings from Last 3 Encounters:   03/20/23 122/88   02/27/23 (!) 154/80   12/06/22 (!) 144/80       Recent Lab results:  Lab Results   Component Value Date    CHOL 202 (H) 12/14/2022   ,   Lab Results   Component Value  Date    HDL 70 12/14/2022   ,   Lab Results   Component Value Date    LDLCALC 117 (H) 12/14/2022   ,   Lab Results   Component Value Date    TRIG 61 12/14/2022        Lab Results   Component Value Date    GLUCOSE 91 12/14/2022    GLUCOSE NEGATIVE 12/14/2022   ,   Lab Results   Component Value Date    HGBA1C 5.5 12/14/2022         Lab Results   Component Value Date    CREATININE 0.72 12/14/2022       Lab Results   Component Value Date    TSH 1.47 12/14/2022

## 2023-05-05 DIAGNOSIS — J45.41 MODERATE PERSISTENT ASTHMA WITH ACUTE EXACERBATION: Primary | ICD-10-CM

## 2023-05-05 DIAGNOSIS — R55 VASOVAGAL SYNCOPE: ICD-10-CM

## 2023-05-06 LAB
ALBUMIN SERPL-MCNC: 4.2 G/DL (ref 3.6–5.1)
ALBUMIN/GLOB SERPL: 1.8 (CALC) (ref 1–2.5)
ALP SERPL-CCNC: 51 U/L (ref 37–153)
ALT SERPL-CCNC: 11 U/L (ref 6–29)
AST SERPL-CCNC: 13 U/L (ref 10–35)
BILIRUB SERPL-MCNC: 0.5 MG/DL (ref 0.2–1.2)
BUN SERPL-MCNC: 19 MG/DL (ref 7–25)
BUN/CREAT SERPL: NORMAL (CALC) (ref 6–22)
CALCIUM SERPL-MCNC: 9.8 MG/DL (ref 8.6–10.4)
CHLORIDE SERPL-SCNC: 103 MMOL/L (ref 98–110)
CO2 SERPL-SCNC: 26 MMOL/L (ref 20–32)
CREAT SERPL-MCNC: 0.82 MG/DL (ref 0.5–1.05)
EGFRCR SERPLBLD CKD-EPI 2021: 79 ML/MIN/1.73M2
ERYTHROCYTE [DISTWIDTH] IN BLOOD BY AUTOMATED COUNT: 13.3 % (ref 11–15)
GLOBULIN SER CALC-MCNC: 2.4 G/DL (CALC) (ref 1.9–3.7)
GLUCOSE SERPL-MCNC: 96 MG/DL (ref 65–139)
HCT VFR BLD AUTO: 38 % (ref 35–45)
HGB BLD-MCNC: 13.2 G/DL (ref 11.7–15.5)
MCH RBC QN AUTO: 30.8 PG (ref 27–33)
MCHC RBC AUTO-ENTMCNC: 34.7 G/DL (ref 32–36)
MCV RBC AUTO: 88.8 FL (ref 80–100)
PLATELET # BLD AUTO: 262 THOUSAND/UL (ref 140–400)
PMV BLD REES-ECKER: 12.6 FL (ref 7.5–12.5)
POTASSIUM SERPL-SCNC: 4.4 MMOL/L (ref 3.5–5.3)
PROT SERPL-MCNC: 6.6 G/DL (ref 6.1–8.1)
RBC # BLD AUTO: 4.28 MILLION/UL (ref 3.8–5.1)
SODIUM SERPL-SCNC: 138 MMOL/L (ref 135–146)
WBC # BLD AUTO: 8.3 THOUSAND/UL (ref 3.8–10.8)

## 2023-05-11 ENCOUNTER — TELEPHONE (OUTPATIENT)
Dept: FAMILY MEDICINE | Facility: CLINIC | Age: 63
End: 2023-05-11

## 2023-05-11 NOTE — TELEPHONE ENCOUNTER
Patient was told via [employer] message from Dr. Andrea to schedule a MyChart visit. No available MyChart visits for tomorrow.    If possible, please schedule a [facility] MyChart visit with Dr. Andrea for today or tomorrow (depending when she wants to see this patient.) Thank you.

## 2023-05-12 NOTE — TELEPHONE ENCOUNTER
Spoke with pt since did not have openings on Thursday and not in on Friday,  She is feeling a liittel better today in terms of dizziness.   Wants to try to change to eliquis to see if her dizziness is better .  I do not think related to med but ok to try,  She is aware to watch for bleeding in the transition and when to take with the switch  She is going to continue  PT

## 2023-06-13 RX ORDER — APIXABAN 5 MG/1
5 TABLET, FILM COATED ORAL 2 TIMES DAILY
Qty: 60 TABLET | Refills: 0 | Status: SHIPPED | OUTPATIENT
Start: 2023-06-13 | End: 2023-07-11

## 2023-06-13 NOTE — TELEPHONE ENCOUNTER
Medicine Refill Request    Last Office: 3/20/2023   Last Consult Visit: Visit date not found  Last Telemedicine Visit: 11/4/2022 Brigitte Andrea, DO    Next Appointment: 12/5/2023      Current Outpatient Medications:   •  ADVAIR -21 mcg/actuation inhaler, INHALE 2 PUFFS 2 (TWO) TIMES A DAY. RINSE MOUTH WITH WATER AFTER USE TO REDUCE AFTERTASTE AND INCIDENCE OF CANDIDIASIS. DO NOT SWALLOW. FOR PATIENTS NOT ON A VENTILATOR, A SPACER IS RECOMMENDED TO BE USED WITH THIS MEDICATION/INHALER., Disp: 12 g, Rfl: 11  •  albuterol 2.5 mg /3 mL (0.083 %) nebulizer solution, Take 3 mL (2.5 mg total) by nebulization every 6 (six) hours as needed for wheezing., Disp: 75 mL, Rfl: 1  •  albuterol HFA 90 mcg/actuation inhaler, TAKE 2 PUFFS BY MOUTH EVERY 4 HOURS AS NEEDED FOR WHEEZE, Disp: 6.7 each, Rfl: 1  •  apixaban (ELIQUIS) 5 mg tablet, Take 1 tablet (5 mg total) by mouth 2 (two) times a day., Disp: 60 tablet, Rfl: 0  •  cholecalciferol, vitamin D3, 50 mcg (2,000 unit) capsule, No SIG Entered, Disp: , Rfl:   •  fluticasone propionate (FLONASE) 50 mcg/actuation nasal spray, Administer 2 sprays into each nostril daily. (Patient taking differently: Administer 2 sprays into each nostril daily as needed.), Disp: 1 Bottle, Rfl: 5  •  inhalational spacing device spacer, 1 Device daily., Disp: 1 each, Rfl: 1  •  levalbuterol (XOPENEX) 0.63 mg/3 mL nebulizer solution, Take 1 ampule by nebulization 3 (three) times a day., Disp: 72 mL, Rfl: 11  •  omeprazole (PriLOSEC) 40 mg capsule, TAKE 1 CAPSULE BY MOUTH EVERY DAY BEFORE BREAKFAST, Disp: 30 capsule, Rfl: 1  •  SYNTHROID 100 mcg tablet, TAKE 1 TABLET BY MOUTH EVERY DAY, Disp: 90 tablet, Rfl: 0  •  VITAMIN B COMPLEX ORAL, Take by mouth., Disp: , Rfl:   •  XARELTO 20 mg tablet, TAKE 1 TABLET BY MOUTH EVERY DAY WITH DINNER, Disp: 30 tablet, Rfl: 1      BP Readings from Last 3 Encounters:   03/20/23 122/88   02/27/23 (!) 154/80   12/06/22 (!) 144/80       Recent Lab results:  Lab  Results   Component Value Date    CHOL 202 (H) 12/14/2022   ,   Lab Results   Component Value Date    HDL 70 12/14/2022   ,   Lab Results   Component Value Date    LDLCALC 117 (H) 12/14/2022   ,   Lab Results   Component Value Date    TRIG 61 12/14/2022        Lab Results   Component Value Date    GLUCOSE 96 05/05/2023   ,   Lab Results   Component Value Date    HGBA1C 5.5 12/14/2022         Lab Results   Component Value Date    CREATININE 0.82 05/05/2023       Lab Results   Component Value Date    TSH 1.47 12/14/2022

## 2023-07-11 NOTE — TELEPHONE ENCOUNTER
Medicine Refill Request    Last Office: 3/20/2023   Last Consult Visit: Visit date not found  Last Telemedicine Visit: 11/4/2022 Brigitte Andrea, DO    Next Appointment: 12/5/2023      Current Outpatient Medications:   •  ADVAIR -21 mcg/actuation inhaler, INHALE 2 PUFFS 2 (TWO) TIMES A DAY. RINSE MOUTH WITH WATER AFTER USE TO REDUCE AFTERTASTE AND INCIDENCE OF CANDIDIASIS. DO NOT SWALLOW. FOR PATIENTS NOT ON A VENTILATOR, A SPACER IS RECOMMENDED TO BE USED WITH THIS MEDICATION/INHALER., Disp: 12 g, Rfl: 11  •  albuterol 2.5 mg /3 mL (0.083 %) nebulizer solution, Take 3 mL (2.5 mg total) by nebulization every 6 (six) hours as needed for wheezing., Disp: 75 mL, Rfl: 1  •  albuterol HFA 90 mcg/actuation inhaler, TAKE 2 PUFFS BY MOUTH EVERY 4 HOURS AS NEEDED FOR WHEEZE, Disp: 6.7 each, Rfl: 1  •  cholecalciferol, vitamin D3, 50 mcg (2,000 unit) capsule, No SIG Entered, Disp: , Rfl:   •  ELIQUIS 5 mg tablet, TAKE 1 TABLET BY MOUTH TWICE A DAY, Disp: 60 tablet, Rfl: 0  •  fluticasone propionate (FLONASE) 50 mcg/actuation nasal spray, Administer 2 sprays into each nostril daily. (Patient taking differently: Administer 2 sprays into each nostril daily as needed.), Disp: 1 Bottle, Rfl: 5  •  inhalational spacing device spacer, 1 Device daily., Disp: 1 each, Rfl: 1  •  levalbuterol (XOPENEX) 0.63 mg/3 mL nebulizer solution, Take 1 ampule by nebulization 3 (three) times a day., Disp: 72 mL, Rfl: 11  •  omeprazole (PriLOSEC) 40 mg capsule, TAKE 1 CAPSULE BY MOUTH EVERY DAY BEFORE BREAKFAST, Disp: 30 capsule, Rfl: 1  •  SYNTHROID 100 mcg tablet, TAKE 1 TABLET BY MOUTH EVERY DAY, Disp: 90 tablet, Rfl: 0  •  VITAMIN B COMPLEX ORAL, Take by mouth., Disp: , Rfl:       BP Readings from Last 3 Encounters:   03/20/23 122/88   02/27/23 (!) 154/80   12/06/22 (!) 144/80       Recent Lab results:  Lab Results   Component Value Date    CHOL 202 (H) 12/14/2022   ,   Lab Results   Component Value Date    HDL 70 12/14/2022   ,   Lab  Results   Component Value Date    LDLCALC 117 (H) 12/14/2022   ,   Lab Results   Component Value Date    TRIG 61 12/14/2022        Lab Results   Component Value Date    GLUCOSE 96 05/05/2023   ,   Lab Results   Component Value Date    HGBA1C 5.5 12/14/2022         Lab Results   Component Value Date    CREATININE 0.82 05/05/2023       Lab Results   Component Value Date    TSH 1.47 12/14/2022

## 2023-07-12 RX ORDER — APIXABAN 5 MG/1
5 TABLET, FILM COATED ORAL 2 TIMES DAILY
Qty: 180 TABLET | Refills: 0 | Status: SHIPPED | OUTPATIENT
Start: 2023-07-12 | End: 2023-10-06

## 2023-07-14 ENCOUNTER — TELEPHONE (OUTPATIENT)
Dept: FAMILY MEDICINE | Facility: CLINIC | Age: 63
End: 2023-07-14
Payer: COMMERCIAL

## 2023-07-25 ENCOUNTER — HOSPITAL ENCOUNTER (OUTPATIENT)
Dept: CARDIOLOGY | Facility: HOSPITAL | Age: 63
Discharge: HOME | End: 2023-07-25
Attending: FAMILY MEDICINE
Payer: COMMERCIAL

## 2023-07-25 PROCEDURE — 93970 EXTREMITY STUDY: CPT

## 2023-08-03 DIAGNOSIS — E03.9 ACQUIRED HYPOTHYROIDISM: ICD-10-CM

## 2023-08-03 NOTE — TELEPHONE ENCOUNTER
Medicine Refill Request    Last Office Visit: 3/20/2023   Last Consult Visit: Visit date not found  Last Telemedicine Visit: 11/4/2022 Brigitte Andrea, DO    Next Appointment: 12/5/2023      Current Outpatient Medications:   •  ADVAIR -21 mcg/actuation inhaler, INHALE 2 PUFFS 2 (TWO) TIMES A DAY. RINSE MOUTH WITH WATER AFTER USE TO REDUCE AFTERTASTE AND INCIDENCE OF CANDIDIASIS. DO NOT SWALLOW. FOR PATIENTS NOT ON A VENTILATOR, A SPACER IS RECOMMENDED TO BE USED WITH THIS MEDICATION/INHALER., Disp: 12 g, Rfl: 11  •  albuterol 2.5 mg /3 mL (0.083 %) nebulizer solution, Take 3 mL (2.5 mg total) by nebulization every 6 (six) hours as needed for wheezing., Disp: 75 mL, Rfl: 1  •  albuterol HFA 90 mcg/actuation inhaler, TAKE 2 PUFFS BY MOUTH EVERY 4 HOURS AS NEEDED FOR WHEEZE, Disp: 6.7 each, Rfl: 1  •  cholecalciferol, vitamin D3, 50 mcg (2,000 unit) capsule, No SIG Entered, Disp: , Rfl:   •  ELIQUIS 5 mg tablet, TAKE 1 TABLET BY MOUTH TWICE A DAY, Disp: 180 tablet, Rfl: 0  •  fluticasone propionate (FLONASE) 50 mcg/actuation nasal spray, Administer 2 sprays into each nostril daily. (Patient taking differently: Administer 2 sprays into each nostril daily as needed.), Disp: 1 Bottle, Rfl: 5  •  inhalational spacing device spacer, 1 Device daily., Disp: 1 each, Rfl: 1  •  levalbuterol (XOPENEX) 0.63 mg/3 mL nebulizer solution, Take 1 ampule by nebulization 3 (three) times a day., Disp: 72 mL, Rfl: 11  •  omeprazole (PriLOSEC) 40 mg capsule, TAKE 1 CAPSULE BY MOUTH EVERY DAY BEFORE BREAKFAST, Disp: 30 capsule, Rfl: 1  •  SYNTHROID 100 mcg tablet, TAKE 1 TABLET BY MOUTH EVERY DAY, Disp: 90 tablet, Rfl: 0  •  VITAMIN B COMPLEX ORAL, Take by mouth., Disp: , Rfl:       BP Readings from Last 3 Encounters:   03/20/23 122/88   02/27/23 (!) 154/80   12/06/22 (!) 144/80       Recent Lab results:  Lab Results   Component Value Date    CHOL 202 (H) 12/14/2022   ,   Lab Results   Component Value Date    HDL 70 12/14/2022   ,    Lab Results   Component Value Date    LDLCALC 117 (H) 12/14/2022   ,   Lab Results   Component Value Date    TRIG 61 12/14/2022        Lab Results   Component Value Date    GLUCOSE 96 05/05/2023   ,   Lab Results   Component Value Date    HGBA1C 5.5 12/14/2022         Lab Results   Component Value Date    CREATININE 0.82 05/05/2023       Lab Results   Component Value Date    TSH 1.47 12/14/2022           Lab Results   Component Value Date    HGBA1C 5.5 12/14/2022

## 2023-08-04 RX ORDER — LEVOTHYROXINE SODIUM 100 UG/1
TABLET ORAL
Qty: 90 TABLET | Refills: 0 | Status: SHIPPED | OUTPATIENT
Start: 2023-08-04 | End: 2023-11-02

## 2023-08-16 RX ORDER — FLUTICASONE PROPIONATE AND SALMETEROL XINAFOATE 230; 21 UG/1; UG/1
AEROSOL, METERED RESPIRATORY (INHALATION)
Qty: 12 G | Refills: 11 | Status: SHIPPED | OUTPATIENT
Start: 2023-08-16 | End: 2024-01-29 | Stop reason: SDUPTHER

## 2023-11-02 DIAGNOSIS — E03.9 ACQUIRED HYPOTHYROIDISM: ICD-10-CM

## 2023-11-02 RX ORDER — LEVOTHYROXINE SODIUM 100 UG/1
TABLET ORAL
Qty: 90 TABLET | Refills: 0 | Status: SHIPPED | OUTPATIENT
Start: 2023-11-02 | End: 2024-01-26

## 2023-11-02 NOTE — TELEPHONE ENCOUNTER
Medicine Refill Request    Last Office Visit: 3/20/2023   Last Consult Visit: Visit date not found  Last Telemedicine Visit: 11/4/2022 Brigitte Andrea,     Next Appointment: 12/5/2023      Current Outpatient Medications:     ADVAIR -21 mcg/actuation inhaler, INHALE 2 PUFFS TWICE A DAY. RINSE MOUTH WITH WATER AFTER USE. DO NOT SWALLOW, Disp: 12 g, Rfl: 11    albuterol 2.5 mg /3 mL (0.083 %) nebulizer solution, Take 3 mL (2.5 mg total) by nebulization every 6 (six) hours as needed for wheezing., Disp: 75 mL, Rfl: 1    albuterol HFA 90 mcg/actuation inhaler, TAKE 2 PUFFS BY MOUTH EVERY 4 HOURS AS NEEDED FOR WHEEZE, Disp: 6.7 each, Rfl: 1    cholecalciferol, vitamin D3, 50 mcg (2,000 unit) capsule, No SIG Entered, Disp: , Rfl:     ELIQUIS 5 mg tablet, TAKE 1 TABLET BY MOUTH TWICE A DAY, Disp: 180 tablet, Rfl: 0    fluticasone propionate (FLONASE) 50 mcg/actuation nasal spray, Administer 2 sprays into each nostril daily. (Patient taking differently: Administer 2 sprays into each nostril daily as needed.), Disp: 1 Bottle, Rfl: 5    inhalational spacing device spacer, 1 Device daily., Disp: 1 each, Rfl: 1    levalbuterol (XOPENEX) 0.63 mg/3 mL nebulizer solution, Take 1 ampule by nebulization 3 (three) times a day., Disp: 72 mL, Rfl: 11    omeprazole (PriLOSEC) 40 mg capsule, TAKE 1 CAPSULE BY MOUTH EVERY DAY BEFORE BREAKFAST, Disp: 30 capsule, Rfl: 1    SYNTHROID 100 mcg tablet, TAKE 1 TABLET BY MOUTH EVERY DAY, Disp: 90 tablet, Rfl: 0    VITAMIN B COMPLEX ORAL, Take by mouth., Disp: , Rfl:       BP Readings from Last 3 Encounters:   03/20/23 122/88   02/27/23 (!) 154/80   12/06/22 (!) 144/80       Recent Lab results:  Lab Results   Component Value Date    CHOL 202 (H) 12/14/2022   ,   Lab Results   Component Value Date    HDL 70 12/14/2022   ,   Lab Results   Component Value Date    LDLCALC 117 (H) 12/14/2022   ,   Lab Results   Component Value Date    TRIG 61 12/14/2022        Lab Results    Component Value Date    GLUCOSE 96 05/05/2023   ,   Lab Results   Component Value Date    HGBA1C 5.5 12/14/2022         Lab Results   Component Value Date    CREATININE 0.82 05/05/2023       Lab Results   Component Value Date    TSH 1.47 12/14/2022           Lab Results   Component Value Date    HGBA1C 5.5 12/14/2022

## 2023-12-05 ENCOUNTER — OFFICE VISIT (OUTPATIENT)
Dept: FAMILY MEDICINE | Facility: CLINIC | Age: 63
End: 2023-12-05
Payer: COMMERCIAL

## 2023-12-05 ENCOUNTER — TELEPHONE (OUTPATIENT)
Dept: FAMILY MEDICINE | Facility: CLINIC | Age: 63
End: 2023-12-05

## 2023-12-05 VITALS
HEIGHT: 63 IN | OXYGEN SATURATION: 98 % | SYSTOLIC BLOOD PRESSURE: 128 MMHG | RESPIRATION RATE: 16 BRPM | TEMPERATURE: 97.8 F | HEART RATE: 81 BPM | DIASTOLIC BLOOD PRESSURE: 80 MMHG | BODY MASS INDEX: 42.06 KG/M2 | WEIGHT: 237.4 LBS

## 2023-12-05 DIAGNOSIS — F41.9 ANXIETY: ICD-10-CM

## 2023-12-05 DIAGNOSIS — R10.13 EPIGASTRIC PAIN: ICD-10-CM

## 2023-12-05 DIAGNOSIS — I82.461 ACUTE DEEP VEIN THROMBOSIS (DVT) OF CALF MUSCLE VEIN OF RIGHT LOWER EXTREMITY (CMS/HCC): ICD-10-CM

## 2023-12-05 DIAGNOSIS — M19.90 ARTHRITIS: ICD-10-CM

## 2023-12-05 DIAGNOSIS — J45.909 UNCOMPLICATED ASTHMA, UNSPECIFIED ASTHMA SEVERITY, UNSPECIFIED WHETHER PERSISTENT: ICD-10-CM

## 2023-12-05 DIAGNOSIS — Z00.00 ROUTINE GENERAL MEDICAL EXAMINATION AT A HEALTH CARE FACILITY: Primary | ICD-10-CM

## 2023-12-05 DIAGNOSIS — K63.5 POLYP OF COLON, UNSPECIFIED PART OF COLON, UNSPECIFIED TYPE: ICD-10-CM

## 2023-12-05 DIAGNOSIS — E03.9 ACQUIRED HYPOTHYROIDISM: ICD-10-CM

## 2023-12-05 DIAGNOSIS — E66.01 OBESITY, CLASS III, BMI 40-49.9 (MORBID OBESITY) (CMS/HCC): ICD-10-CM

## 2023-12-05 DIAGNOSIS — C43.9 MALIGNANT MELANOMA OF SKIN (CMS/HCC): ICD-10-CM

## 2023-12-05 PROCEDURE — 69209 REMOVE IMPACTED EAR WAX UNI: CPT | Performed by: FAMILY MEDICINE

## 2023-12-05 PROCEDURE — 99397 PER PM REEVAL EST PAT 65+ YR: CPT | Performed by: FAMILY MEDICINE

## 2023-12-05 PROCEDURE — 3008F BODY MASS INDEX DOCD: CPT | Performed by: FAMILY MEDICINE

## 2023-12-05 RX ORDER — ACYCLOVIR 800 MG/1
TABLET ORAL
COMMUNITY
Start: 2023-12-04 | End: 2024-08-23

## 2023-12-05 RX ORDER — DOXYCYCLINE HYCLATE 100 MG
100 TABLET ORAL 2 TIMES DAILY
Qty: 14 TABLET | Refills: 0 | Status: SHIPPED | OUTPATIENT
Start: 2023-12-05 | End: 2023-12-12

## 2023-12-05 ASSESSMENT — ENCOUNTER SYMPTOMS
CARDIOVASCULAR NEGATIVE: 1
HEMATOLOGIC/LYMPHATIC NEGATIVE: 1
CONSTITUTIONAL NEGATIVE: 1
GASTROINTESTINAL NEGATIVE: 1
MUSCULOSKELETAL NEGATIVE: 1
NEUROLOGICAL NEGATIVE: 1
RESPIRATORY NEGATIVE: 1

## 2023-12-05 NOTE — PROGRESS NOTES
Subjective      Patient ID: Edmundo Dumas is a 63 y.o. female.    Pt is here for physical.  She is on acyclvir for shingles she was having itchiness a few weeks before  and then got pain , went to ER, now on zovirax.  One open spot.  just started that yesterday after scratching        Tobacco  Allergies  Meds  Problems  Med Hx  Surg Hx  Fam Hx       Review of Systems   Constitutional: Negative.    Respiratory: Negative.    Cardiovascular: Negative.    Gastrointestinal: Negative.    Musculoskeletal: Negative.    Neurological: Negative.    Hematological: Negative.      Allergies   Allergen Reactions   • Contrast  [Iodinated Contrast Media] Nausea And Vomiting and Dermatitis   • Montelukast      Vision change   • Moxifloxacin Nausea And Vomiting and Dermatitis   • Penicillins      Other reaction(s): Hives   • Sulfa (Sulfonamide Antibiotics) Nausea And Vomiting     Current Outpatient Medications   Medication Sig Dispense Refill   • doxycycline hyclate (VIBRA-TABS) 100 mg tablet Take 1 tablet (100 mg total) by mouth 2 (two) times a day for 7 days. 14 tablet 0   • acyclovir (ZOVIRAX) 800 mg tablet      • ADVAIR -21 mcg/actuation inhaler INHALE 2 PUFFS TWICE A DAY. RINSE MOUTH WITH WATER AFTER USE. DO NOT SWALLOW 12 g 11   • albuterol HFA 90 mcg/actuation inhaler TAKE 2 PUFFS BY MOUTH EVERY 4 HOURS AS NEEDED FOR WHEEZE 6.7 each 1   • cholecalciferol, vitamin D3, 50 mcg (2,000 unit) capsule No SIG Entered     • ELIQUIS 5 mg tablet TAKE 1 TABLET BY MOUTH TWICE A  tablet 0   • fluticasone propionate (FLONASE) 50 mcg/actuation nasal spray Administer 2 sprays into each nostril daily. (Patient taking differently: Administer 2 sprays into each nostril daily as needed.) 1 Bottle 5   • inhalational spacing device spacer 1 Device daily. 1 each 1   • levalbuterol (XOPENEX) 0.63 mg/3 mL nebulizer solution Take 1 ampule by nebulization 3 (three) times a day. 72 mL 11   • omeprazole (PriLOSEC) 40 mg capsule TAKE  "1 CAPSULE BY MOUTH EVERY DAY BEFORE BREAKFAST 30 capsule 1   • SYNTHROID 100 mcg tablet TAKE 1 TABLET BY MOUTH EVERY DAY 90 tablet 0   • VITAMIN B COMPLEX ORAL Take by mouth.       No current facility-administered medications for this visit.     Past Medical History:   Diagnosis Date   • Hx of phlebitis    • Hypothyroid 4/10/2014    Overview:    • Malignant melanoma of skin (CMS/HCC) 4/10/2014    Overview:    • Mild intermittent asthma with acute exacerbation 4/10/2014    Overview:    • Reflux esophagitis 4/10/2014    Overview:      Past Surgical History:   Procedure Laterality Date   • POLYPECTOMY      from stomach   • THYROIDECTOMY       Social History     Tobacco Use   • Smoking status: Never   • Smokeless tobacco: Never   Vaping Use   • Vaping Use: Never used   Substance Use Topics   • Alcohol use: Not Currently   • Drug use: Never     Family History   Problem Relation Age of Onset   • Heart disease Biological Mother    • Arrhythmia Biological Mother    • Hypertension Biological Mother        Objective   Vitals:    12/05/23 0825   BP: 128/80   Pulse: 81   Resp: 16   Temp: 36.6 °C (97.8 °F)   SpO2: 98%   Weight: 108 kg (237 lb 6.4 oz)   Height: 1.6 m (5' 3\")    Body mass index is 42.05 kg/m².  Physical Exam  Constitutional:       Appearance: Normal appearance. She is obese.   HENT:      Right Ear: Tympanic membrane normal.      Left Ear: Tympanic membrane normal.   Eyes:      Extraocular Movements: Extraocular movements intact.      Pupils: Pupils are equal, round, and reactive to light.   Cardiovascular:      Rate and Rhythm: Normal rate and regular rhythm.      Pulses: Normal pulses.      Heart sounds: Normal heart sounds.   Pulmonary:      Effort: Pulmonary effort is normal.      Breath sounds: Normal breath sounds.   Abdominal:      General: Abdomen is flat.   Musculoskeletal:         General: Normal range of motion.   Skin:     General: Skin is warm and dry.   Neurological:      General: No focal deficit " present.      Mental Status: She is alert and oriented to person, place, and time.   Psychiatric:         Mood and Affect: Mood normal.         Assessment/Plan   Diagnoses and all orders for this visit:    Routine general medical examination at a health care facility (Primary)  Assessment & Plan:  Labs    Recommend Prevnar 20 and flu shot after better from her current possible shingles      Acute deep vein thrombosis (DVT) of calf muscle vein of right lower extremity (CMS/[facility])  Assessment & Plan:  Patient does have a history of this.  Did see the hematologist.  For now we will use Eliquis as needed when taking long trips via car or if ill and not moving as much or flying.      Anxiety  Assessment & Plan:  Stable.  No current medications  Lost her mom in October/ grieving       Arthritis  Assessment & Plan:  OTC collagen  Trial of voltaren prn when not on eliquis         Uncomplicated asthma, unspecified asthma severity, unspecified whether persistent  Assessment & Plan:  Remains on the Advair inhaler and as needed Xopenex.  Does not use that very often but has it is a nebulizer.  Could print a prescription for Xopenex inhaler      BMI 40.0-44.9, adult (CMS/[facility])  Assessment & Plan:  BMI 42     Orders:  -     CBC and Differential  -     Comprehensive metabolic panel  -     Hemoglobin A1c  -     Lipid panel  -     TSH 3rd Generation  -     Urinalysis with microscopic    Epigastric pain  Assessment & Plan:  On omeprazole and stable      Acquired hypothyroidism  Assessment & Plan:  Lab Results   Component Value Date    TSH 1.47 12/14/2022         Orders:  -     CBC and Differential  -     Comprehensive metabolic panel  -     Hemoglobin A1c  -     Lipid panel  -     TSH 3rd Generation  -     Urinalysis with microscopic    Malignant melanoma of skin (CMS/[facility])  Assessment & Plan:  Follows with derm regularly     Orders:  -     CBC and Differential  -     Comprehensive metabolic panel  -     Hemoglobin A1c  -     Lipid  panel  -     TSH 3rd Generation  -     Urinalysis with microscopic    Obesity, Class III, BMI 40-49.9 (morbid obesity) (CMS/[facility])  Assessment & Plan:  Pt has lost a few pounds  Trying to watch diet     Orders:  -     CBC and Differential  -     Comprehensive metabolic panel  -     Hemoglobin A1c  -     Lipid panel  -     TSH 3rd Generation  -     Urinalysis with microscopic    Polyp of colon, unspecified part of colon, unspecified type  Assessment & Plan:  Pt awaits call from [place] to set up       Other orders  -     doxycycline hyclate (VIBRA-TABS) 100 mg tablet; Take 1 tablet (100 mg total) by mouth 2 (two) times a day for 7 days.      Patient agrees and verbalizes understanding of medication and treatment plan discussed at today's visit.  Patient was instructed to call or follow-up if symptoms persist or worsen.         Brigitte Andrea, DO      This note was created with voice recognition software. Inadvertent dictation errors should be disregarded. Please contact my office for clarification.

## 2023-12-05 NOTE — ASSESSMENT & PLAN NOTE
Patient does have a history of this.  Did see the hematologist.  For now we will use Eliquis as needed when taking long trips via car or if ill and not moving as much or flying.

## 2023-12-05 NOTE — TELEPHONE ENCOUNTER
Patient was in earlier this morning for a physical and had BW done. She is asking if (BB) ordered a vitamin b-12 level and if not, can that be added to her order?

## 2023-12-06 LAB
ALBUMIN SERPL-MCNC: 4.3 G/DL (ref 3.9–4.9)
ALBUMIN/GLOB SERPL: 1.9 {RATIO} (ref 1.2–2.2)
ALP SERPL-CCNC: 64 IU/L (ref 44–121)
ALT SERPL-CCNC: 11 IU/L (ref 0–32)
APPEARANCE UR: CLEAR
AST SERPL-CCNC: 11 IU/L (ref 0–40)
BACTERIA #/AREA URNS HPF: NORMAL /[HPF]
BASOPHILS # BLD AUTO: 0 X10E3/UL (ref 0–0.2)
BASOPHILS NFR BLD AUTO: 1 %
BILIRUB SERPL-MCNC: 0.3 MG/DL (ref 0–1.2)
BILIRUB UR QL STRIP: NEGATIVE
BUN SERPL-MCNC: 18 MG/DL (ref 8–27)
BUN/CREAT SERPL: 25 (ref 12–28)
CALCIUM SERPL-MCNC: 9.6 MG/DL (ref 8.7–10.3)
CASTS URNS QL MICRO: NORMAL /LPF
CHLORIDE SERPL-SCNC: 105 MMOL/L (ref 96–106)
CHOLEST SERPL-MCNC: 192 MG/DL (ref 100–199)
CO2 SERPL-SCNC: 23 MMOL/L (ref 20–29)
COLOR UR: YELLOW
CREAT SERPL-MCNC: 0.72 MG/DL (ref 0.57–1)
EGFRCR SERPLBLD CKD-EPI 2021: 93 ML/MIN/1.73
EOSINOPHIL # BLD AUTO: 0.2 X10E3/UL (ref 0–0.4)
EOSINOPHIL NFR BLD AUTO: 2 %
EPI CELLS #/AREA URNS HPF: NORMAL /HPF (ref 0–10)
ERYTHROCYTE [DISTWIDTH] IN BLOOD BY AUTOMATED COUNT: 12.4 % (ref 11.7–15.4)
GLOBULIN SER CALC-MCNC: 2.3 G/DL (ref 1.5–4.5)
GLUCOSE SERPL-MCNC: 97 MG/DL (ref 70–99)
GLUCOSE UR QL STRIP: NEGATIVE
HBA1C MFR BLD: 5.7 % (ref 4.8–5.6)
HCT VFR BLD AUTO: 41.8 % (ref 34–46.6)
HDLC SERPL-MCNC: 67 MG/DL
HGB BLD-MCNC: 13.9 G/DL (ref 11.1–15.9)
HGB UR QL STRIP: NEGATIVE
IMM GRANULOCYTES # BLD AUTO: 0 X10E3/UL (ref 0–0.1)
IMM GRANULOCYTES NFR BLD AUTO: 0 %
KETONES UR QL STRIP: NEGATIVE
LDLC SERPL CALC-MCNC: 117 MG/DL (ref 0–99)
LEUKOCYTE ESTERASE UR QL STRIP: NEGATIVE
LYMPHOCYTES # BLD AUTO: 1.9 X10E3/UL (ref 0.7–3.1)
LYMPHOCYTES NFR BLD AUTO: 26 %
MCH RBC QN AUTO: 29.9 PG (ref 26.6–33)
MCHC RBC AUTO-ENTMCNC: 33.3 G/DL (ref 31.5–35.7)
MCV RBC AUTO: 90 FL (ref 79–97)
MICRO URNS: NORMAL
MICRO URNS: NORMAL
MONOCYTES # BLD AUTO: 0.4 X10E3/UL (ref 0.1–0.9)
MONOCYTES NFR BLD AUTO: 6 %
NEUTROPHILS # BLD AUTO: 4.8 X10E3/UL (ref 1.4–7)
NEUTROPHILS NFR BLD AUTO: 65 %
NITRITE UR QL STRIP: NEGATIVE
PH UR STRIP: 6.5 [PH] (ref 5–7.5)
PLATELET # BLD AUTO: 250 X10E3/UL (ref 150–450)
POTASSIUM SERPL-SCNC: 4.7 MMOL/L (ref 3.5–5.2)
PROT SERPL-MCNC: 6.6 G/DL (ref 6–8.5)
PROT UR QL STRIP: NEGATIVE
RBC # BLD AUTO: 4.65 X10E6/UL (ref 3.77–5.28)
RBC #/AREA URNS HPF: NORMAL /HPF (ref 0–2)
SODIUM SERPL-SCNC: 141 MMOL/L (ref 134–144)
SP GR UR STRIP: 1.01 (ref 1–1.03)
SPECIMEN STATUS: NORMAL
TRIGL SERPL-MCNC: 40 MG/DL (ref 0–149)
TSH SERPL DL<=0.005 MIU/L-ACNC: 0.76 UIU/ML (ref 0.45–4.5)
UROBILINOGEN UR STRIP-MCNC: 0.2 MG/DL (ref 0.2–1)
VLDLC SERPL CALC-MCNC: 8 MG/DL (ref 5–40)
WBC # BLD AUTO: 7.3 X10E3/UL (ref 3.4–10.8)
WBC #/AREA URNS HPF: NORMAL /HPF (ref 0–5)

## 2023-12-09 NOTE — RESULT ENCOUNTER NOTE
Abrahan Wyatt  As you are able to see your labs are okay including CBC, urinalysis, CMP.  TSH.  Hemoglobin A1c is 5.7.  Just into the prediabetic range, cholesterol is okay.  192.  LDL is 117.  HDL 67.   B12 request was added but they did not have enough blood to run it. The last time we did it is was in the normal range 399( lower normal)  I can put another order in to go to lab near you .  You could also just try taking low dose b12 based on the old reading .  500 mcg 3 x week . Let me know if you want an order to be placed

## 2023-12-20 ENCOUNTER — TELEPHONE (OUTPATIENT)
Dept: FAMILY MEDICINE | Facility: CLINIC | Age: 63
End: 2023-12-20
Payer: COMMERCIAL

## 2023-12-20 NOTE — TELEPHONE ENCOUNTER
Referral placed for patient to receive wound care from Dr. Imtiaz Baxter with [facility].  Office is requesting additional information re: wound.    -Length patient has had wound  -measurement of wound(s)  -Treatment(s) that have already been tried.  -If wound is draining and/or open    Records can be faxed to them @ [phone]    (Brigitte, please let Marilyn know what records you want printed or if you need to write a note to be faxed. I am not sure if this is associated with her DVT and messages from June or if this is in relation to the wound she showed you at the time of her physical exam on 12/5/23).

## 2024-01-15 RX ORDER — APIXABAN 5 MG/1
5 TABLET, FILM COATED ORAL 2 TIMES DAILY
Qty: 60 TABLET | Refills: 2 | Status: SHIPPED | OUTPATIENT
Start: 2024-01-15 | End: 2024-08-23 | Stop reason: ALTCHOICE

## 2024-01-15 NOTE — TELEPHONE ENCOUNTER
Medicine Refill Request    Last Office Visit: 12/5/2023   Last Consult Visit: Visit date not found  Last Telemedicine Visit: 11/4/2022 Brigitte Andrea, DO    Next Appointment: Visit date not found      Current Outpatient Medications:   •  acyclovir (ZOVIRAX) 800 mg tablet, , Disp: , Rfl:   •  ADVAIR -21 mcg/actuation inhaler, INHALE 2 PUFFS TWICE A DAY. RINSE MOUTH WITH WATER AFTER USE. DO NOT SWALLOW, Disp: 12 g, Rfl: 11  •  albuterol HFA 90 mcg/actuation inhaler, TAKE 2 PUFFS BY MOUTH EVERY 4 HOURS AS NEEDED FOR WHEEZE, Disp: 6.7 each, Rfl: 1  •  cholecalciferol, vitamin D3, 50 mcg (2,000 unit) capsule, No SIG Entered, Disp: , Rfl:   •  ELIQUIS 5 mg tablet, TAKE 1 TABLET BY MOUTH TWICE A DAY, Disp: 180 tablet, Rfl: 0  •  fluticasone propionate (FLONASE) 50 mcg/actuation nasal spray, Administer 2 sprays into each nostril daily. (Patient taking differently: Administer 2 sprays into each nostril daily as needed.), Disp: 1 Bottle, Rfl: 5  •  inhalational spacing device spacer, 1 Device daily., Disp: 1 each, Rfl: 1  •  levalbuterol (XOPENEX) 0.63 mg/3 mL nebulizer solution, Take 1 ampule by nebulization 3 (three) times a day., Disp: 72 mL, Rfl: 11  •  omeprazole (PriLOSEC) 40 mg capsule, TAKE 1 CAPSULE BY MOUTH EVERY DAY BEFORE BREAKFAST, Disp: 30 capsule, Rfl: 1  •  SYNTHROID 100 mcg tablet, TAKE 1 TABLET BY MOUTH EVERY DAY, Disp: 90 tablet, Rfl: 0  •  VITAMIN B COMPLEX ORAL, Take by mouth., Disp: , Rfl:       BP Readings from Last 3 Encounters:   12/05/23 128/80   03/20/23 122/88   02/27/23 (!) 154/80       Recent Lab results:  Lab Results   Component Value Date    CHOL 192 12/05/2023   ,   Lab Results   Component Value Date    HDL 67 12/05/2023   ,   Lab Results   Component Value Date    LDLCALC 117 (H) 12/05/2023   ,   Lab Results   Component Value Date    TRIG 40 12/05/2023        Lab Results   Component Value Date    GLUCOSE 97 12/05/2023   ,   Lab Results   Component Value Date    HGBA1C 5.7 (H)  12/05/2023         Lab Results   Component Value Date    CREATININE 0.72 12/05/2023       Lab Results   Component Value Date    TSH 0.757 12/05/2023           Lab Results   Component Value Date    HGBA1C 5.7 (H) 12/05/2023

## 2024-01-25 DIAGNOSIS — E03.9 ACQUIRED HYPOTHYROIDISM: ICD-10-CM

## 2024-01-26 RX ORDER — LEVOTHYROXINE SODIUM 100 UG/1
TABLET ORAL
Qty: 90 TABLET | Refills: 0 | Status: SHIPPED | OUTPATIENT
Start: 2024-01-26 | End: 2024-02-28

## 2024-01-26 NOTE — TELEPHONE ENCOUNTER
Medicine Refill Request    Last Office Visit: 12/5/2023   Last Consult Visit: Visit date not found  Last Telemedicine Visit: 11/4/2022 Brigitte Andrea, DO    Next Appointment: Visit date not found      Current Outpatient Medications:   •  acyclovir (ZOVIRAX) 800 mg tablet, , Disp: , Rfl:   •  ADVAIR -21 mcg/actuation inhaler, INHALE 2 PUFFS TWICE A DAY. RINSE MOUTH WITH WATER AFTER USE. DO NOT SWALLOW, Disp: 12 g, Rfl: 11  •  albuterol HFA 90 mcg/actuation inhaler, TAKE 2 PUFFS BY MOUTH EVERY 4 HOURS AS NEEDED FOR WHEEZE, Disp: 6.7 each, Rfl: 1  •  cholecalciferol, vitamin D3, 50 mcg (2,000 unit) capsule, No SIG Entered, Disp: , Rfl:   •  ELIQUIS 5 mg tablet, TAKE 1 TABLET BY MOUTH TWICE A DAY, Disp: 60 tablet, Rfl: 2  •  fluticasone propionate (FLONASE) 50 mcg/actuation nasal spray, Administer 2 sprays into each nostril daily. (Patient taking differently: Administer 2 sprays into each nostril daily as needed.), Disp: 1 Bottle, Rfl: 5  •  inhalational spacing device spacer, 1 Device daily., Disp: 1 each, Rfl: 1  •  levalbuterol (XOPENEX) 0.63 mg/3 mL nebulizer solution, Take 1 ampule by nebulization 3 (three) times a day., Disp: 72 mL, Rfl: 11  •  omeprazole (PriLOSEC) 40 mg capsule, TAKE 1 CAPSULE BY MOUTH EVERY DAY BEFORE BREAKFAST, Disp: 30 capsule, Rfl: 1  •  SYNTHROID 100 mcg tablet, TAKE 1 TABLET BY MOUTH EVERY DAY, Disp: 90 tablet, Rfl: 0  •  VITAMIN B COMPLEX ORAL, Take by mouth., Disp: , Rfl:       BP Readings from Last 3 Encounters:   12/05/23 128/80   03/20/23 122/88   02/27/23 (!) 154/80       Recent Lab results:  Lab Results   Component Value Date    CHOL 192 12/05/2023   ,   Lab Results   Component Value Date    HDL 67 12/05/2023   ,   Lab Results   Component Value Date    LDLCALC 117 (H) 12/05/2023   ,   Lab Results   Component Value Date    TRIG 40 12/05/2023        Lab Results   Component Value Date    GLUCOSE 97 12/05/2023   ,   Lab Results   Component Value Date    HGBA1C 5.7 (H)  12/05/2023         Lab Results   Component Value Date    CREATININE 0.72 12/05/2023       Lab Results   Component Value Date    TSH 0.757 12/05/2023           Lab Results   Component Value Date    HGBA1C 5.7 (H) 12/05/2023

## 2024-02-21 DIAGNOSIS — J45.21 MILD INTERMITTENT ASTHMA WITH ACUTE EXACERBATION: ICD-10-CM

## 2024-02-21 RX ORDER — ALBUTEROL SULFATE 90 UG/1
INHALANT RESPIRATORY (INHALATION)
Qty: 6.7 EACH | Refills: 1 | Status: SHIPPED | OUTPATIENT
Start: 2024-02-21 | End: 2024-12-30

## 2024-02-27 ENCOUNTER — TELEPHONE (OUTPATIENT)
Dept: FAMILY MEDICINE | Facility: CLINIC | Age: 64
End: 2024-02-27
Payer: COMMERCIAL

## 2024-02-27 DIAGNOSIS — E03.9 ACQUIRED HYPOTHYROIDISM: ICD-10-CM

## 2024-02-27 NOTE — TELEPHONE ENCOUNTER
Medicine Refill Request    Last Office Visit: 12/5/2023   Last Consult Visit: Visit date not found  Last Telemedicine Visit: 11/4/2022 Brigitte Andrea, DO    Next Appointment: Visit date not found      Current Outpatient Medications:   •  acyclovir (ZOVIRAX) 800 mg tablet, , Disp: , Rfl:   •  ADVAIR -21 mcg/actuation inhaler, INHALE 2 PUFFS TWICE A DAY. RINSE MOUTH WITH WATER AFTER USE. DO NOT SWALLOW, Disp: 12 g, Rfl: 11  •  albuterol HFA 90 mcg/actuation inhaler, INHALE 2 PUFFS BY MOUTH EVERY 4 HOURS AS NEEDED FOR WHEEZE, Disp: 6.7 each, Rfl: 1  •  cholecalciferol, vitamin D3, 50 mcg (2,000 unit) capsule, No SIG Entered, Disp: , Rfl:   •  ELIQUIS 5 mg tablet, TAKE 1 TABLET BY MOUTH TWICE A DAY, Disp: 60 tablet, Rfl: 2  •  fluticasone propionate (FLONASE) 50 mcg/actuation nasal spray, Administer 2 sprays into each nostril daily. (Patient taking differently: Administer 2 sprays into each nostril daily as needed.), Disp: 1 Bottle, Rfl: 5  •  inhalational spacing device spacer, 1 Device daily., Disp: 1 each, Rfl: 1  •  levalbuterol (XOPENEX) 0.63 mg/3 mL nebulizer solution, Take 1 ampule by nebulization 3 (three) times a day., Disp: 72 mL, Rfl: 11  •  omeprazole (PriLOSEC) 40 mg capsule, TAKE 1 CAPSULE BY MOUTH EVERY DAY BEFORE BREAKFAST, Disp: 30 capsule, Rfl: 1  •  SYNTHROID 100 mcg tablet, TAKE 1 TABLET BY MOUTH EVERY DAY, Disp: 90 tablet, Rfl: 0  •  VITAMIN B COMPLEX ORAL, Take by mouth., Disp: , Rfl:       BP Readings from Last 3 Encounters:   12/05/23 128/80   03/20/23 122/88   02/27/23 (!) 154/80       Recent Lab results:  Lab Results   Component Value Date    CHOL 192 12/05/2023   ,   Lab Results   Component Value Date    HDL 67 12/05/2023   ,   Lab Results   Component Value Date    LDLCALC 117 (H) 12/05/2023   ,   Lab Results   Component Value Date    TRIG 40 12/05/2023        Lab Results   Component Value Date    GLUCOSE 97 12/05/2023   ,   Lab Results   Component Value Date    HGBA1C 5.7 (H)  12/05/2023         Lab Results   Component Value Date    CREATININE 0.72 12/05/2023       Lab Results   Component Value Date    TSH 0.757 12/05/2023           Lab Results   Component Value Date    HGBA1C 5.7 (H) 12/05/2023

## 2024-02-27 NOTE — TELEPHONE ENCOUNTER
Patient received a call from [facility] ([place]) that it is time for her to get a renewal of her prior auth for Advair inhaler 230-21 HFA.  No further information given.

## 2024-02-28 RX ORDER — LEVOTHYROXINE SODIUM 100 UG/1
TABLET ORAL
Qty: 90 TABLET | Refills: 0 | Status: SHIPPED | OUTPATIENT
Start: 2024-02-28 | End: 2024-07-19

## 2024-07-18 DIAGNOSIS — E03.9 ACQUIRED HYPOTHYROIDISM: ICD-10-CM

## 2024-07-18 NOTE — TELEPHONE ENCOUNTER
Medicine Refill Request    Last Office Visit: 12/5/2023   Last Consult Visit: Visit date not found  Last Telemedicine Visit: 11/4/2022 Brigitte Andrea, DO    Next Appointment: Visit date not found      Current Outpatient Medications:     acyclovir (ZOVIRAX) 800 mg tablet, , Disp: , Rfl:     ADVAIR -21 mcg/actuation inhaler, INHALE 2 PUFFS TWICE A DAY. RINSE MOUTH WITH WATER AFTER USE. DO NOT SWALLOW, Disp: 12 g, Rfl: 11    albuterol HFA 90 mcg/actuation inhaler, INHALE 2 PUFFS BY MOUTH EVERY 4 HOURS AS NEEDED FOR WHEEZE, Disp: 6.7 each, Rfl: 1    cholecalciferol, vitamin D3, 50 mcg (2,000 unit) capsule, No SIG Entered, Disp: , Rfl:     ELIQUIS 5 mg tablet, TAKE 1 TABLET BY MOUTH TWICE A DAY, Disp: 60 tablet, Rfl: 2    fluticasone propionate (FLONASE) 50 mcg/actuation nasal spray, Administer 2 sprays into each nostril daily. (Patient taking differently: Administer 2 sprays into each nostril daily as needed.), Disp: 1 Bottle, Rfl: 5    inhalational spacing device spacer, 1 Device daily., Disp: 1 each, Rfl: 1    levalbuterol (XOPENEX) 0.63 mg/3 mL nebulizer solution, Take 1 ampule by nebulization 3 (three) times a day., Disp: 72 mL, Rfl: 11    omeprazole (PriLOSEC) 40 mg capsule, TAKE 1 CAPSULE BY MOUTH EVERY DAY BEFORE BREAKFAST, Disp: 30 capsule, Rfl: 1    SYNTHROID 100 mcg tablet, TAKE 1 TABLET BY MOUTH EVERY DAY, Disp: 90 tablet, Rfl: 0    VITAMIN B COMPLEX ORAL, Take by mouth., Disp: , Rfl:       BP Readings from Last 3 Encounters:   12/05/23 128/80   03/20/23 122/88   02/27/23 (!) 154/80       Recent Lab results:  Lab Results   Component Value Date    CHOL 192 12/05/2023   ,   Lab Results   Component Value Date    HDL 67 12/05/2023   ,   Lab Results   Component Value Date    LDLCALC 117 (H) 12/05/2023   ,   Lab Results   Component Value Date    TRIG 40 12/05/2023        Lab Results   Component Value Date    GLUCOSE 97 12/05/2023   ,   Lab Results   Component Value Date    HGBA1C 5.7 (H) 12/05/2023          Lab Results   Component Value Date    CREATININE 0.72 12/05/2023       Lab Results   Component Value Date    TSH 0.757 12/05/2023           Lab Results   Component Value Date    HGBA1C 5.7 (H) 12/05/2023

## 2024-07-19 RX ORDER — LEVOTHYROXINE SODIUM 100 UG/1
TABLET ORAL
Qty: 90 TABLET | Refills: 0 | Status: SHIPPED | OUTPATIENT
Start: 2024-07-19 | End: 2024-10-16

## 2024-08-23 ENCOUNTER — TELEMEDICINE (OUTPATIENT)
Dept: FAMILY MEDICINE | Facility: CLINIC | Age: 64
End: 2024-08-23
Payer: COMMERCIAL

## 2024-08-23 DIAGNOSIS — U07.1 COVID-19: Primary | ICD-10-CM

## 2024-08-23 PROCEDURE — 99442 PR PHYS/QHP TELEPHONE EVALUATION 11-20 MIN: CPT | Mod: 95 | Performed by: FAMILY MEDICINE

## 2024-08-23 ASSESSMENT — ENCOUNTER SYMPTOMS
MYALGIAS: 1
SORE THROAT: 1
HEADACHES: 1
ACTIVITY CHANGE: 1
SHORTNESS OF BREATH: 0
ARTHRALGIAS: 1
FATIGUE: 1
RHINORRHEA: 1
FEVER: 0
COUGH: 1

## 2024-08-23 NOTE — PROGRESS NOTES
Verification of Patient Location:  The patient affirms they are currently located in the following state: [place]    Request for Consent:    Audio Only Encounter   You and I are about to have a telemedicine check-in or visit. This is allowed because you have requested it. This telemedicine visit will be billed to your health insurance or you, if you are self-insured. You understand you will be responsible for any copayments or coinsurances that apply to your telemedicine visit. Before starting our telemedicine visit, I am required to get your consent for this virtual check-in or visit by telemedicine. Do you consent?    Patient Response to Request for Consent:  Yes      Visit Documentation:  Subjective     Patient ID: Edmundo Dumas is a 64 y.o. female.  1960      Multiple attempts to do video encounter but unable to get the computer to connect.  Telephone encounter done instead.  Patient's  diagnosed with COVID last week.  Patient noticed onset of symptoms yesterday.  Initially she just had some trouble clearing her throat then increasing congestion and cough through the night now she has significant congestion body aches no fever yet her ears feel full and her body and joints hurt.  Patient did COVID test this morning which was positive.  Patient sounds tired over the phone.  Updated medications.  Patient states she stopped Eliquis 3 weeks ago at the advice of one of her specialist.  Kidney function is normal.  She does have a history of asthma which has been stable.        The following have been reviewed and updated as appropriate in this visit:   Tobacco  Allergies  Meds  Problems  Med Hx  Surg Hx  Fam Hx       Review of Systems   Constitutional:  Positive for activity change and fatigue. Negative for fever.   HENT:  Positive for congestion, rhinorrhea and sore throat.    Respiratory:  Positive for cough. Negative for shortness of breath.    Musculoskeletal:  Positive for arthralgias  and myalgias.   Neurological:  Positive for headaches.         Assessment/Plan   Diagnoses and all orders for this visit:    COVID-19 (Primary)  -     nirmatrelvir-ritonavir (PAXLOVID) tablets,dose pack dose package; Take 3 tablets by mouth 2 (two) times a day for 5 days Indications: COVID-19. Each dose is 2 tablets of nirmatrelvir and one tablet of ritonavir    Patient with COVID-19.  Start patient on Paxlovid.  Reviewed over possible side effects.  Continue to monitor respiratory symptoms especially in light of patient's asthma and follow-up if any worsening symptoms.  Fluids rest and Tylenol as needed.    Time Spent:  I spent 14 minutes on this date of service performing the following activities: obtaining history, performing examination, entering orders, documenting, and preparing for visit.

## 2024-10-16 DIAGNOSIS — E03.9 ACQUIRED HYPOTHYROIDISM: ICD-10-CM

## 2024-10-16 RX ORDER — LEVOTHYROXINE SODIUM 100 UG/1
TABLET ORAL
Qty: 90 TABLET | Refills: 0 | Status: SHIPPED | OUTPATIENT
Start: 2024-10-16 | End: 2024-12-12

## 2024-10-16 NOTE — TELEPHONE ENCOUNTER
Medicine Refill Request    Last Office Visit: 12/5/2023   Last Consult Visit: Visit date not found  Last Telemedicine Visit: 8/23/2024 Audelia Reeves, DO    Next Appointment: Visit date not found      Current Outpatient Medications:     ADVAIR -21 mcg/actuation inhaler, INHALE 2 PUFFS TWICE A DAY. RINSE MOUTH WITH WATER AFTER USE. DO NOT SWALLOW, Disp: 12 g, Rfl: 11    albuterol HFA 90 mcg/actuation inhaler, INHALE 2 PUFFS BY MOUTH EVERY 4 HOURS AS NEEDED FOR WHEEZE, Disp: 6.7 each, Rfl: 1    cholecalciferol, vitamin D3, 50 mcg (2,000 unit) capsule, No SIG Entered, Disp: , Rfl:     inhalational spacing device spacer, 1 Device daily., Disp: 1 each, Rfl: 1    levalbuterol (XOPENEX) 0.63 mg/3 mL nebulizer solution, Take 1 ampule by nebulization 3 (three) times a day., Disp: 72 mL, Rfl: 11    omeprazole (PriLOSEC) 40 mg capsule, TAKE 1 CAPSULE BY MOUTH EVERY DAY BEFORE BREAKFAST (Patient taking differently: 20 mg daily before breakfast.), Disp: 30 capsule, Rfl: 1    SYNTHROID 100 mcg tablet, TAKE 1 TABLET BY MOUTH EVERY DAY, Disp: 90 tablet, Rfl: 0      BP Readings from Last 3 Encounters:   12/05/23 128/80   03/20/23 122/88   02/27/23 (!) 154/80       Recent Lab results:  Lab Results   Component Value Date    CHOL 192 12/05/2023   ,   Lab Results   Component Value Date    HDL 67 12/05/2023   ,   Lab Results   Component Value Date    LDLCALC 117 (H) 12/05/2023   ,   Lab Results   Component Value Date    TRIG 40 12/05/2023        Lab Results   Component Value Date    GLUCOSE 97 12/05/2023   ,   Lab Results   Component Value Date    HGBA1C 5.7 (H) 12/05/2023         Lab Results   Component Value Date    CREATININE 0.72 12/05/2023       Lab Results   Component Value Date    TSH 0.757 12/05/2023           Lab Results   Component Value Date    HGBA1C 5.7 (H) 12/05/2023

## 2024-12-10 ENCOUNTER — OFFICE VISIT (OUTPATIENT)
Dept: FAMILY MEDICINE | Facility: CLINIC | Age: 64
End: 2024-12-10
Payer: COMMERCIAL

## 2024-12-10 VITALS
HEIGHT: 63 IN | OXYGEN SATURATION: 98 % | BODY MASS INDEX: 40.75 KG/M2 | HEART RATE: 83 BPM | SYSTOLIC BLOOD PRESSURE: 152 MMHG | TEMPERATURE: 97.8 F | RESPIRATION RATE: 16 BRPM | WEIGHT: 230 LBS | DIASTOLIC BLOOD PRESSURE: 84 MMHG

## 2024-12-10 DIAGNOSIS — I82.461 ACUTE DEEP VEIN THROMBOSIS (DVT) OF CALF MUSCLE VEIN OF RIGHT LOWER EXTREMITY (CMS/HCC): ICD-10-CM

## 2024-12-10 DIAGNOSIS — K21.00 GASTROESOPHAGEAL REFLUX DISEASE WITH ESOPHAGITIS WITHOUT HEMORRHAGE: ICD-10-CM

## 2024-12-10 DIAGNOSIS — F41.9 ANXIETY: ICD-10-CM

## 2024-12-10 DIAGNOSIS — K63.5 POLYP OF COLON, UNSPECIFIED PART OF COLON, UNSPECIFIED TYPE: ICD-10-CM

## 2024-12-10 DIAGNOSIS — E03.9 ACQUIRED HYPOTHYROIDISM: ICD-10-CM

## 2024-12-10 DIAGNOSIS — E78.00 ELEVATED CHOLESTEROL: ICD-10-CM

## 2024-12-10 DIAGNOSIS — C43.9 MALIGNANT MELANOMA OF SKIN (CMS/HCC): ICD-10-CM

## 2024-12-10 DIAGNOSIS — E66.01 OBESITY, CLASS III, BMI 40-49.9 (MORBID OBESITY) (CMS/HCC): ICD-10-CM

## 2024-12-10 DIAGNOSIS — Z00.00 ROUTINE GENERAL MEDICAL EXAMINATION AT A HEALTH CARE FACILITY: Primary | ICD-10-CM

## 2024-12-10 DIAGNOSIS — Z86.718 HX OF DEEP VENOUS THROMBOSIS: ICD-10-CM

## 2024-12-10 DIAGNOSIS — R12 HEARTBURN: ICD-10-CM

## 2024-12-10 DIAGNOSIS — R03.0 ELEVATED BP WITHOUT DIAGNOSIS OF HYPERTENSION: ICD-10-CM

## 2024-12-10 DIAGNOSIS — J45.21 MILD INTERMITTENT ASTHMA WITH ACUTE EXACERBATION: ICD-10-CM

## 2024-12-10 PROBLEM — M79.661 RIGHT CALF PAIN: Status: RESOLVED | Noted: 2023-02-27 | Resolved: 2024-12-10

## 2024-12-10 PROBLEM — S06.0XAA CONCUSSION: Status: RESOLVED | Noted: 2023-02-21 | Resolved: 2024-12-10

## 2024-12-10 PROCEDURE — 36415 COLL VENOUS BLD VENIPUNCTURE: CPT | Performed by: FAMILY MEDICINE

## 2024-12-10 PROCEDURE — 3008F BODY MASS INDEX DOCD: CPT | Performed by: FAMILY MEDICINE

## 2024-12-10 PROCEDURE — 99397 PER PM REEVAL EST PAT 65+ YR: CPT | Performed by: FAMILY MEDICINE

## 2024-12-10 RX ORDER — OMEPRAZOLE 20 MG/1
20 CAPSULE, DELAYED RELEASE ORAL
COMMUNITY
End: 2024-12-10 | Stop reason: ALTCHOICE

## 2024-12-10 RX ORDER — AZITHROMYCIN 250 MG/1
TABLET, FILM COATED ORAL
Qty: 6 TABLET | Refills: 0 | Status: SHIPPED | OUTPATIENT
Start: 2024-12-10 | End: 2024-12-15

## 2024-12-10 ASSESSMENT — PATIENT HEALTH QUESTIONNAIRE - PHQ9: SUM OF ALL RESPONSES TO PHQ9 QUESTIONS 1 & 2: 0

## 2024-12-10 ASSESSMENT — ENCOUNTER SYMPTOMS
MUSCULOSKELETAL NEGATIVE: 1
CARDIOVASCULAR NEGATIVE: 1
HEMATOLOGIC/LYMPHATIC NEGATIVE: 1
WHEEZING: 1
PSYCHIATRIC NEGATIVE: 1
COUGH: 1
ENDOCRINE NEGATIVE: 1
NEUROLOGICAL NEGATIVE: 1
EYES NEGATIVE: 1
CONSTITUTIONAL NEGATIVE: 1
ALLERGIC/IMMUNOLOGIC NEGATIVE: 1
GASTROINTESTINAL NEGATIVE: 1

## 2024-12-10 NOTE — PROGRESS NOTES
Subjective      Patient ID: Edmundo Dumas is a 65 y.o. female.    Pt here for check up.   Doing ok  BP up a little . Currently has an upper resp illness          Tobacco  Allergies  Meds  Problems  Med Hx  Surg Hx  Fam Hx       Review of Systems   Constitutional: Negative.    HENT:  Positive for congestion.         10 day of cold sx    Eyes: Negative.    Respiratory:  Positive for cough and wheezing.    Cardiovascular: Negative.    Gastrointestinal: Negative.    Endocrine: Negative.    Genitourinary: Negative.    Musculoskeletal: Negative.    Skin: Negative.    Allergic/Immunologic: Negative.    Neurological: Negative.    Hematological: Negative.    Psychiatric/Behavioral: Negative.       Allergies   Allergen Reactions    Contrast  [Iodinated Contrast Media] Nausea And Vomiting and Dermatitis    Montelukast      Vision change    Moxifloxacin Nausea And Vomiting and Dermatitis    Penicillins      Other reaction(s): Hives    Sulfa (Sulfonamide Antibiotics) Nausea And Vomiting     Current Outpatient Medications   Medication Sig Dispense Refill    ADVAIR -21 mcg/actuation inhaler INHALE 2 PUFFS TWICE A DAY. RINSE MOUTH WITH WATER AFTER USE. DO NOT SWALLOW 12 g 11    albuterol HFA 90 mcg/actuation inhaler INHALE 2 PUFFS BY MOUTH EVERY 4 HOURS AS NEEDED FOR WHEEZING 6.7 each 1    cholecalciferol, vitamin D3, 50 mcg (2,000 unit) capsule No SIG Entered      inhalational spacing device spacer 1 Device daily. 1 each 1    levalbuterol (XOPENEX) 0.63 mg/3 mL nebulizer solution Take 1 ampule by nebulization 3 (three) times a day. 72 mL 11    SYNTHROID 100 mcg tablet TAKE 1 TABLET BY MOUTH EVERY DAY 90 tablet 0     No current facility-administered medications for this visit.     Past Medical History:   Diagnosis Date    Elevated cholesterol     Hx of phlebitis     Hypothyroid 04/10/2014    Overview:     Malignant melanoma of skin (CMS/[facility]) 04/10/2014    Overview:     Mild intermittent asthma with acute  "exacerbation 04/10/2014    Overview:     Prediabetes     Reflux esophagitis 04/10/2014    Overview:      Past Surgical History   Procedure Laterality Date    Polypectomy      from stomach    Thyroidectomy       Social History     Tobacco Use    Smoking status: Never    Smokeless tobacco: Never   Vaping Use    Vaping status: Never Used   Substance Use Topics    Alcohol use: Not Currently    Drug use: Never     Family History   Problem Relation Name Age of Onset    Heart disease Biological Mother      Arrhythmia Biological Mother      Hypertension Biological Mother         Objective   Vitals:    12/10/24 0753   BP: (!) 152/84   BP Location: Left upper arm   Patient Position: Sitting   Pulse: 83   Resp: 16   Temp: 36.6 °C (97.8 °F)   TempSrc: Tympanic   SpO2: 98%   Weight: 104 kg (230 lb)   Height: 1.6 m (5' 3\")    Body mass index is 40.74 kg/m².  Physical Exam  Vitals reviewed.   Constitutional:       Appearance: Normal appearance. She is obese. She is not toxic-appearing.   HENT:      Head: Normocephalic and atraumatic.      Right Ear: Tympanic membrane and ear canal normal.      Left Ear: Tympanic membrane and ear canal normal.      Nose: Nose normal. No congestion.      Mouth/Throat:      Mouth: Mucous membranes are moist.   Eyes:      Extraocular Movements: Extraocular movements intact.      Conjunctiva/sclera: Conjunctivae normal.      Pupils: Pupils are equal, round, and reactive to light.   Neck:      Vascular: No carotid bruit.   Cardiovascular:      Rate and Rhythm: Normal rate and regular rhythm.      Pulses: Normal pulses.      Heart sounds: No murmur heard.  Pulmonary:      Effort: Pulmonary effort is normal.      Breath sounds: Normal breath sounds. No wheezing or rhonchi.   Abdominal:      General: Abdomen is flat.      Palpations: Abdomen is soft.      Tenderness: There is no abdominal tenderness. There is no guarding or rebound.      Hernia: No hernia is present.   Musculoskeletal:         General: " Normal range of motion.      Cervical back: Normal range of motion and neck supple.      Right lower leg: No edema.      Left lower leg: No edema.   Lymphadenopathy:      Cervical: No cervical adenopathy.   Skin:     General: Skin is warm.      Findings: No bruising, erythema, lesion or rash.   Neurological:      General: No focal deficit present.      Mental Status: She is alert and oriented to person, place, and time.   Psychiatric:         Mood and Affect: Mood normal.         Behavior: Behavior normal.         Assessment/Plan   Diagnoses and all orders for this visit:    Routine general medical examination at a health care facility (Primary)  Assessment & Plan:  Check labs  Refuses flu and Prevnar 20  Does not want a covid      Orders:  -     CBC and Differential  -     Comprehensive metabolic panel  -     Hemoglobin A1c  -     Lipid panel  -     TSH 3rd Generation  -     Urinalysis with microscopic  -     Vitamin D 25 hydroxy  -     Vitamin B12  -     Ferritin  -     Iron and TIBC    Hx of deep venous thrombosis  Assessment & Plan:  Patient does have a history of this.  Did see the hematologist.  For now we will use Eliquis as needed when taking long trips via car or if ill and not moving as much or flying.      Malignant melanoma of skin (CMS/HCC)  Assessment & Plan:  Follows derm      BMI 40.0-44.9, adult (CMS/HCC)  Assessment & Plan:  40.7      Anxiety  Assessment & Plan:  Stable.  No current medications  Lost her mom in October/ grieving       Heartburn  Assessment & Plan:  Omeprazole 40 mg  If misses will have sx  Can use prn famotidine if has pain on the PPI   Suggest EGD with upcoming colon      Acquired hypothyroidism  Assessment & Plan:  Lab Results   Component Value Date    TSH 0.757 12/05/2023     Re ck at day of visit    Orders:  -     TSH 3rd Generation    Mild intermittent asthma with acute exacerbation  Assessment & Plan:  Uses Advair 2 puffs BID   Has a cold now for 10 days  Gets bronchitis a  lot in winter       Gastroesophageal reflux disease with esophagitis without hemorrhage  Assessment & Plan:  Pt stopped PPI ( omeprazole 20 mg 7 weeks ago)  To see for follow up..      Obesity, Class III, BMI 40-49.9 (morbid obesity) (CMS/[facility])  Assessment & Plan:  Has lost a few pounds   [facility]  40.7 BMI       Polyp of colon, unspecified part of colon, unspecified type  Assessment & Plan:  Awaiting call from [facility] to set up  Discussed doing endoscopy when having colon due to her heartburn sx /persistent      Elevated BP without diagnosis of hypertension  Assessment & Plan:  Currently has a URI       Acute deep vein thrombosis (DVT) of calf muscle vein of right lower extremity (CMS/[facility])    Elevated cholesterol  Assessment & Plan:  Lab Results   Component Value Date    CHOL 212 (H) 12/10/2024    CHOL 192 12/05/2023    CHOL 202 (H) 12/14/2022     Lab Results   Component Value Date    HDL 68 12/10/2024    HDL 67 12/05/2023    HDL 70 12/14/2022     Lab Results   Component Value Date    LDLCALC 130 (H) 12/10/2024    LDLCALC 117 (H) 12/05/2023    LDLCALC 117 (H) 12/14/2022     Lab Results   Component Value Date    TRIG 81 12/10/2024    TRIG 40 12/05/2023    TRIG 61 12/14/2022             Other orders  -     azithromycin (ZITHROMAX) 250 mg tablet; Take 2 tablets the first day, then 1 tablet daily for 4 days.  -     MICROSCOPIC EXAMINIATION        Patient agrees and verbalizes understanding of medication and treatment plan discussed at today's visit.  Patient was instructed to call or follow-up if symptoms persist or worsen.         Brigitte Andrea,       This note was created with voice recognition software. Inadvertent dictation errors should be disregarded. Please contact my office for clarification.

## 2024-12-11 LAB
25(OH)D3+25(OH)D2 SERPL-MCNC: 39.4 NG/ML (ref 30–100)
APPEARANCE UR: CLEAR
BACTERIA #/AREA URNS HPF: NORMAL /[HPF]
BASOPHILS # BLD AUTO: 0 X10E3/UL (ref 0–0.2)
BASOPHILS NFR BLD AUTO: 0 %
BILIRUB UR QL STRIP: NEGATIVE
CASTS URNS QL MICRO: NORMAL /LPF
COLOR UR: YELLOW
EOSINOPHIL # BLD AUTO: 0.2 X10E3/UL (ref 0–0.4)
EOSINOPHIL NFR BLD AUTO: 2 %
EPI CELLS #/AREA URNS HPF: NORMAL /HPF (ref 0–10)
ERYTHROCYTE [DISTWIDTH] IN BLOOD BY AUTOMATED COUNT: 12.6 % (ref 11.7–15.4)
GLUCOSE UR QL STRIP: NEGATIVE
HBA1C MFR BLD: 6 % (ref 4.8–5.6)
HCT VFR BLD AUTO: 43.2 % (ref 34–46.6)
HGB BLD-MCNC: 14.3 G/DL (ref 11.1–15.9)
HGB UR QL STRIP: NEGATIVE
IMM GRANULOCYTES # BLD AUTO: 0 X10E3/UL (ref 0–0.1)
IMM GRANULOCYTES NFR BLD AUTO: 0 %
KETONES UR QL STRIP: NEGATIVE
LEUKOCYTE ESTERASE UR QL STRIP: NEGATIVE
LYMPHOCYTES # BLD AUTO: 2.2 X10E3/UL (ref 0.7–3.1)
LYMPHOCYTES NFR BLD AUTO: 25 %
MCH RBC QN AUTO: 30.6 PG (ref 26.6–33)
MCHC RBC AUTO-ENTMCNC: 33.1 G/DL (ref 31.5–35.7)
MCV RBC AUTO: 93 FL (ref 79–97)
MICRO URNS: NORMAL
MICRO URNS: NORMAL
MONOCYTES # BLD AUTO: 0.5 X10E3/UL (ref 0.1–0.9)
MONOCYTES NFR BLD AUTO: 6 %
NEUTROPHILS # BLD AUTO: 6 X10E3/UL (ref 1.4–7)
NEUTROPHILS NFR BLD AUTO: 67 %
NITRITE UR QL STRIP: NEGATIVE
PH UR STRIP: 7 [PH] (ref 5–7.5)
PLATELET # BLD AUTO: 290 X10E3/UL (ref 150–450)
PROT UR QL STRIP: NEGATIVE
RBC # BLD AUTO: 4.67 X10E6/UL (ref 3.77–5.28)
RBC #/AREA URNS HPF: NORMAL /HPF (ref 0–2)
SP GR UR STRIP: 1.01 (ref 1–1.03)
UROBILINOGEN UR STRIP-MCNC: 0.2 MG/DL (ref 0.2–1)
VIT B12 SERPL-MCNC: 607 PG/ML (ref 232–1245)
WBC # BLD AUTO: 8.9 X10E3/UL (ref 3.4–10.8)
WBC #/AREA URNS HPF: NORMAL /HPF (ref 0–5)

## 2024-12-12 DIAGNOSIS — E03.9 ACQUIRED HYPOTHYROIDISM: ICD-10-CM

## 2024-12-12 LAB
ALBUMIN SERPL-MCNC: 4.6 G/DL (ref 3.9–4.9)
ALP SERPL-CCNC: 82 IU/L (ref 44–121)
ALT SERPL-CCNC: 14 IU/L (ref 0–32)
AST SERPL-CCNC: 15 IU/L (ref 0–40)
BILIRUB SERPL-MCNC: 0.2 MG/DL (ref 0–1.2)
BUN SERPL-MCNC: 14 MG/DL (ref 8–27)
BUN/CREAT SERPL: 17 (ref 12–28)
CALCIUM SERPL-MCNC: 9.8 MG/DL (ref 8.7–10.3)
CHLORIDE SERPL-SCNC: 106 MMOL/L (ref 96–106)
CHOLEST SERPL-MCNC: 212 MG/DL (ref 100–199)
CO2 SERPL-SCNC: 18 MMOL/L (ref 20–29)
CREAT SERPL-MCNC: 0.81 MG/DL (ref 0.57–1)
EGFRCR SERPLBLD CKD-EPI 2021: 80 ML/MIN/1.73
FERRITIN SERPL-MCNC: 63 NG/ML (ref 15–150)
GLOBULIN SER CALC-MCNC: 2.8 G/DL (ref 1.5–4.5)
GLUCOSE SERPL-MCNC: 113 MG/DL (ref 70–99)
HDLC SERPL-MCNC: 68 MG/DL
IRON SATN MFR SERPL: 25 % (ref 15–55)
IRON SERPL-MCNC: 90 UG/DL (ref 27–139)
LDLC SERPL CALC-MCNC: 130 MG/DL (ref 0–99)
POTASSIUM SERPL-SCNC: 4.7 MMOL/L (ref 3.5–5.2)
PROT SERPL-MCNC: 7.4 G/DL (ref 6–8.5)
SODIUM SERPL-SCNC: 147 MMOL/L (ref 134–144)
TIBC SERPL-MCNC: 353 UG/DL (ref 250–450)
TRIGL SERPL-MCNC: 81 MG/DL (ref 0–149)
TSH SERPL DL<=0.005 MIU/L-ACNC: 0.77 UIU/ML (ref 0.45–4.5)
UIBC SERPL-MCNC: 263 UG/DL (ref 118–369)
VLDLC SERPL CALC-MCNC: 14 MG/DL (ref 5–40)

## 2024-12-12 RX ORDER — LEVOTHYROXINE SODIUM 100 UG/1
TABLET ORAL
Qty: 90 TABLET | Refills: 0 | Status: SHIPPED | OUTPATIENT
Start: 2024-12-12

## 2024-12-12 NOTE — TELEPHONE ENCOUNTER
Medicine Refill Request    Last Office Visit: 12/10/2024   Last Consult Visit: Visit date not found  Last Telemedicine Visit: 8/23/2024 Audelia Reeves,     Next Appointment: 12/11/2025      Current Outpatient Medications:     acetic acid-hydrocortisone (VOSOL-HC) otic solution, Administer 3 drops into the right ear 4 (four) times a day for 5 days., Disp: 10 mL, Rfl: 0    ADVAIR -21 mcg/actuation inhaler, INHALE 2 PUFFS TWICE A DAY. RINSE MOUTH WITH WATER AFTER USE. DO NOT SWALLOW, Disp: 12 g, Rfl: 11    albuterol HFA 90 mcg/actuation inhaler, INHALE 2 PUFFS BY MOUTH EVERY 4 HOURS AS NEEDED FOR WHEEZE, Disp: 6.7 each, Rfl: 1    azithromycin (ZITHROMAX) 250 mg tablet, Take 2 tablets the first day, then 1 tablet daily for 4 days., Disp: 6 tablet, Rfl: 0    cholecalciferol, vitamin D3, 50 mcg (2,000 unit) capsule, No SIG Entered, Disp: , Rfl:     inhalational spacing device spacer, 1 Device daily., Disp: 1 each, Rfl: 1    levalbuterol (XOPENEX) 0.63 mg/3 mL nebulizer solution, Take 1 ampule by nebulization 3 (three) times a day., Disp: 72 mL, Rfl: 11    SYNTHROID 100 mcg tablet, TAKE 1 TABLET BY MOUTH EVERY DAY, Disp: 90 tablet, Rfl: 0      BP Readings from Last 3 Encounters:   12/10/24 (!) 152/84   12/05/23 128/80   03/20/23 122/88       Recent Lab results:  Lab Results   Component Value Date    CHOL 212 (H) 12/10/2024   ,   Lab Results   Component Value Date    HDL 68 12/10/2024   ,   Lab Results   Component Value Date    LDLCALC 130 (H) 12/10/2024   ,   Lab Results   Component Value Date    TRIG 81 12/10/2024        Lab Results   Component Value Date    GLUCOSE 113 (H) 12/10/2024   ,   Lab Results   Component Value Date    HGBA1C 6.0 (H) 12/10/2024         Lab Results   Component Value Date    CREATININE 0.81 12/10/2024       Lab Results   Component Value Date    TSH 0.771 12/10/2024           Lab Results   Component Value Date    HGBA1C 6.0 (H) 12/10/2024

## 2024-12-30 DIAGNOSIS — J45.21 MILD INTERMITTENT ASTHMA WITH ACUTE EXACERBATION: ICD-10-CM

## 2024-12-30 RX ORDER — ALBUTEROL SULFATE 90 UG/1
INHALANT RESPIRATORY (INHALATION)
Qty: 6.7 EACH | Refills: 1 | Status: SHIPPED | OUTPATIENT
Start: 2024-12-30

## 2024-12-30 NOTE — RESULT ENCOUNTER NOTE
The CBC and TSH  was ok , iron and ferritin B12  and Vit D was ok  , urinalysis was ok . The A1c was up to 6.0 ( pre diabetic range)  the cholesterol is up a little to 212 and the LDL is 130 ( goal is to be < 100) . The CMP showed that the sodium was up a little so want to make sure hydrating well and can re ck that in a month.  Re check the lipids and A1c in 3-4 months

## 2024-12-30 NOTE — TELEPHONE ENCOUNTER
Medicine Refill Request    Last Office Visit: 12/10/2024   Last Consult Visit: Visit date not found  Last Telemedicine Visit: 8/23/2024 Audelia Reeves,     Next Appointment: 12/11/2025      Current Outpatient Medications:     ADVAIR -21 mcg/actuation inhaler, INHALE 2 PUFFS TWICE A DAY. RINSE MOUTH WITH WATER AFTER USE. DO NOT SWALLOW, Disp: 12 g, Rfl: 11    albuterol HFA 90 mcg/actuation inhaler, INHALE 2 PUFFS BY MOUTH EVERY 4 HOURS AS NEEDED FOR WHEEZE, Disp: 6.7 each, Rfl: 1    cholecalciferol, vitamin D3, 50 mcg (2,000 unit) capsule, No SIG Entered, Disp: , Rfl:     inhalational spacing device spacer, 1 Device daily., Disp: 1 each, Rfl: 1    levalbuterol (XOPENEX) 0.63 mg/3 mL nebulizer solution, Take 1 ampule by nebulization 3 (three) times a day., Disp: 72 mL, Rfl: 11    SYNTHROID 100 mcg tablet, TAKE 1 TABLET BY MOUTH EVERY DAY, Disp: 90 tablet, Rfl: 0      BP Readings from Last 3 Encounters:   12/10/24 (!) 152/84   12/05/23 128/80   03/20/23 122/88       Recent Lab results:  Lab Results   Component Value Date    CHOL 212 (H) 12/10/2024   ,   Lab Results   Component Value Date    HDL 68 12/10/2024   ,   Lab Results   Component Value Date    LDLCALC 130 (H) 12/10/2024   ,   Lab Results   Component Value Date    TRIG 81 12/10/2024        Lab Results   Component Value Date    GLUCOSE 113 (H) 12/10/2024   ,   Lab Results   Component Value Date    HGBA1C 6.0 (H) 12/10/2024         Lab Results   Component Value Date    CREATININE 0.81 12/10/2024       Lab Results   Component Value Date    TSH 0.771 12/10/2024           Lab Results   Component Value Date    HGBA1C 6.0 (H) 12/10/2024

## 2025-02-24 RX ORDER — FLUTICASONE PROPIONATE AND SALMETEROL XINAFOATE 230; 21 UG/1; UG/1
AEROSOL, METERED RESPIRATORY (INHALATION)
Qty: 12 G | Refills: 11 | Status: SHIPPED | OUTPATIENT
Start: 2025-02-24

## 2025-02-24 NOTE — TELEPHONE ENCOUNTER
Medicine Refill Request    Last Office Visit: 12/10/2024   Last Consult Visit: Visit date not found  Last Telemedicine Visit: 8/23/2024 Audelia Reeves,     Next Appointment: 12/11/2025      Current Outpatient Medications:     ADVAIR -21 mcg/actuation inhaler, INHALE 2 PUFFS TWICE A DAY. RINSE MOUTH WITH WATER AFTER USE. DO NOT SWALLOW, Disp: 12 g, Rfl: 11    albuterol HFA 90 mcg/actuation inhaler, INHALE 2 PUFFS BY MOUTH EVERY 4 HOURS AS NEEDED FOR WHEEZING, Disp: 6.7 each, Rfl: 1    cholecalciferol, vitamin D3, 50 mcg (2,000 unit) capsule, No SIG Entered, Disp: , Rfl:     inhalational spacing device spacer, 1 Device daily., Disp: 1 each, Rfl: 1    levalbuterol (XOPENEX) 0.63 mg/3 mL nebulizer solution, Take 1 ampule by nebulization 3 (three) times a day., Disp: 72 mL, Rfl: 11    SYNTHROID 100 mcg tablet, TAKE 1 TABLET BY MOUTH EVERY DAY, Disp: 90 tablet, Rfl: 0    BP Readings from Last 3 Encounters:   12/10/24 (!) 152/84   12/05/23 128/80   03/20/23 122/88       Recent Lab results:  Lab Results   Component Value Date    CHOL 212 (H) 12/10/2024   ,   Lab Results   Component Value Date    HDL 68 12/10/2024   ,   Lab Results   Component Value Date    LDLCALC 130 (H) 12/10/2024   ,   Lab Results   Component Value Date    TRIG 81 12/10/2024        Lab Results   Component Value Date    GLUCOSE 113 (H) 12/10/2024   ,   Lab Results   Component Value Date    HGBA1C 6.0 (H) 12/10/2024         Lab Results   Component Value Date    CREATININE 0.81 12/10/2024       Lab Results   Component Value Date    TSH 0.771 12/10/2024           Lab Results   Component Value Date    HGBA1C 6.0 (H) 12/10/2024

## 2025-02-27 ENCOUNTER — TELEPHONE (OUTPATIENT)
Dept: FAMILY MEDICINE | Facility: CLINIC | Age: 65
End: 2025-02-27
Payer: COMMERCIAL

## 2025-02-27 NOTE — TELEPHONE ENCOUNTER
Kamille called from [facility] states she needs additional Dx codes for Cbc/w diff, Lipid, Iron with TIBC, A1c and Ferritin. She has codes Z00.00 & E03.9, states insurance is denying those codes. She can be reached at [phone] reference specimen #[id number] says she will also send a fax over and Dx codes can be added and faxed back as well.

## 2025-02-28 ENCOUNTER — TELEPHONE (OUTPATIENT)
Dept: FAMILY MEDICINE | Facility: CLINIC | Age: 65
End: 2025-02-28
Payer: COMMERCIAL

## 2025-03-03 NOTE — TELEPHONE ENCOUNTER
Form completed and will fax back,  added prediabetes, elevated chol and fatigue/polyps for iron/tibc and ferritin

## 2025-03-12 ENCOUNTER — TELEPHONE (OUTPATIENT)
Dept: FAMILY MEDICINE | Facility: CLINIC | Age: 65
End: 2025-03-12
Payer: COMMERCIAL

## 2025-03-12 NOTE — LETTER
March 12, 2025     Edmundo Dumas    Patient: Edmundo Dumas   YOB: 1960   Date of Visit: 3/12/2025       Dear Doctors/    I am referring my patient, Edmundo Dumas, to you for evaluation with  colonoscopy and EGD.       I am referring my patient, Edmundo Dumas, to you for evaluation of heartburn. Pt has heartburn.  Patient currently on omeprazole 40 mg daily.  If patient misses a dose she will have symptoms.  Also even on the medication she can have breakthrough symptoms.  She is due for her upcoming routine colonoscopy and due to her persistent above symptoms think it would be helpful to do an EGD at the same time.  Please let me know if you need any other information.     I appreciate your assistance in her care and look forward to your findings and recommendations.           Sincerely,                             Brigitte Andrea, DO        CC: No Recipients

## 2025-03-12 NOTE — TELEPHONE ENCOUNTER
Papers from [facility] for referral for colon and EGD received. Re wrote letter of referral and my last notes to be faxed so she can try to reschedule

## 2025-03-12 NOTE — ASSESSMENT & PLAN NOTE
Awaiting call from [facility] to set up  Discussed doing endoscopy when having colon due to her heartburn sx /persistent

## 2025-03-12 NOTE — ASSESSMENT & PLAN NOTE
Lab Results   Component Value Date    CHOL 212 (H) 12/10/2024    CHOL 192 12/05/2023    CHOL 202 (H) 12/14/2022     Lab Results   Component Value Date    HDL 68 12/10/2024    HDL 67 12/05/2023    HDL 70 12/14/2022     Lab Results   Component Value Date    LDLCALC 130 (H) 12/10/2024    LDLCALC 117 (H) 12/05/2023    LDLCALC 117 (H) 12/14/2022     Lab Results   Component Value Date    TRIG 81 12/10/2024    TRIG 40 12/05/2023    TRIG 61 12/14/2022

## 2025-03-17 ENCOUNTER — TELEPHONE (OUTPATIENT)
Dept: FAMILY MEDICINE | Facility: CLINIC | Age: 65
End: 2025-03-17
Payer: COMMERCIAL

## 2025-03-17 NOTE — TELEPHONE ENCOUNTER
Office Policies Phone calls/patient messages: Please allow up to 24 hours for someone in the office to contact you about your call or message. Be mindful your provider may be out of the office or your message may require further review. We encourage you to use [employer] for your messages as this is a faster, more efficient way to communicate with our office Medication Refills: 
         
Prescription medications require 48-72 business hours to process. We encourage you to use [employer] for your refills. For controlled medications: Please allow 72 business hours to process. Certain medications may require you to  a written prescription at our office. NO narcotic/controlled medications will be prescribed after 4pm Monday through Friday or on weekends Form/Paperwork Completion: 
         
Please note a $25 fee may incur for all paperwork for completed by our providers. We ask that you allow 7-10 business days. Pre-payment is due prior to picking up/faxing the completed form. You may also download your forms to [employer] to have your doctor print off. 
 
1. Have you been to the ER, urgent care clinic since your last visit? Hospitalized since your last visit?no 2. Have you seen or consulted any other health care providers outside of the [address] [place] [address] since your last visit? Include any pap smears or colon screening.  no 
 
 Patients Endoscopy scheduling form has been sent to [facility] was sent to [phone]

## 2025-03-25 NOTE — TELEPHONE ENCOUNTER
Fax received from [facility].  Endoscopy/Colonoscopy order was accidentally faxed without provider's signature and date/time.    Form on BB ledge to complete and can be re-faxed to [phone].

## 2025-03-27 ENCOUNTER — TELEPHONE (OUTPATIENT)
Dept: FAMILY MEDICINE | Facility: CLINIC | Age: 65
End: 2025-03-27
Payer: COMMERCIAL

## 2025-04-18 DIAGNOSIS — E03.9 ACQUIRED HYPOTHYROIDISM: ICD-10-CM

## 2025-04-18 RX ORDER — LEVOTHYROXINE SODIUM 100 UG/1
100 TABLET ORAL DAILY
Qty: 90 TABLET | Refills: 0 | Status: SHIPPED | OUTPATIENT
Start: 2025-04-18

## 2025-06-12 DIAGNOSIS — E03.9 ACQUIRED HYPOTHYROIDISM: ICD-10-CM

## 2025-06-12 RX ORDER — LEVOTHYROXINE SODIUM 100 UG/1
100 TABLET ORAL DAILY
Qty: 90 TABLET | Refills: 0 | Status: SHIPPED | OUTPATIENT
Start: 2025-06-12

## 2025-06-13 RX ORDER — FLUTICASONE PROPIONATE AND SALMETEROL XINAFOATE 230; 21 UG/1; UG/1
AEROSOL, METERED RESPIRATORY (INHALATION)
Qty: 12 G | Refills: 11 | Status: SHIPPED | OUTPATIENT
Start: 2025-06-13

## 2025-07-01 ENCOUNTER — TELEPHONE (OUTPATIENT)
Dept: FAMILY MEDICINE | Facility: CLINIC | Age: 65
End: 2025-07-01
Payer: COMMERCIAL